# Patient Record
Sex: FEMALE | Race: ASIAN | NOT HISPANIC OR LATINO | Employment: UNEMPLOYED | URBAN - METROPOLITAN AREA
[De-identification: names, ages, dates, MRNs, and addresses within clinical notes are randomized per-mention and may not be internally consistent; named-entity substitution may affect disease eponyms.]

---

## 2018-04-30 ENCOUNTER — OFFICE VISIT - HEALTHEAST (OUTPATIENT)
Dept: FAMILY MEDICINE | Facility: CLINIC | Age: 23
End: 2018-04-30

## 2018-04-30 DIAGNOSIS — Z34.90 PREGNANCY: ICD-10-CM

## 2018-04-30 DIAGNOSIS — N91.2 AMENORRHEA: ICD-10-CM

## 2018-04-30 LAB — HCG UR QL: POSITIVE

## 2018-04-30 ASSESSMENT — MIFFLIN-ST. JEOR: SCORE: 1105.1

## 2018-05-07 ENCOUNTER — RECORDS - HEALTHEAST (OUTPATIENT)
Dept: ADMINISTRATIVE | Facility: OTHER | Age: 23
End: 2018-05-07

## 2018-05-09 ENCOUNTER — PRENATAL OFFICE VISIT - HEALTHEAST (OUTPATIENT)
Dept: FAMILY MEDICINE | Facility: CLINIC | Age: 23
End: 2018-05-09

## 2018-05-09 DIAGNOSIS — B18.1 CHRONIC HEPATITIS B VIRUS INFECTION (H): ICD-10-CM

## 2018-05-09 DIAGNOSIS — D64.9 ANEMIA: ICD-10-CM

## 2018-05-09 DIAGNOSIS — Z34.90 PREGNANCY: ICD-10-CM

## 2018-05-09 DIAGNOSIS — Z12.4 PAP SMEAR FOR CERVICAL CANCER SCREENING: ICD-10-CM

## 2018-05-09 LAB
ALBUMIN SERPL-MCNC: 3.2 G/DL (ref 3.5–5)
ALBUMIN UR-MCNC: NEGATIVE MG/DL
ALP SERPL-CCNC: 40 U/L (ref 45–120)
ALT SERPL W P-5'-P-CCNC: 12 U/L (ref 0–45)
AMPHETAMINES UR QL SCN: NORMAL
APPEARANCE UR: CLEAR
AST SERPL W P-5'-P-CCNC: 17 U/L (ref 0–40)
BARBITURATES UR QL: NORMAL
BASOPHILS # BLD AUTO: 0 THOU/UL (ref 0–0.2)
BASOPHILS NFR BLD AUTO: 0 % (ref 0–2)
BENZODIAZ UR QL: NORMAL
BILIRUB DIRECT SERPL-MCNC: 0.2 MG/DL
BILIRUB SERPL-MCNC: 0.4 MG/DL (ref 0–1)
BILIRUB UR QL STRIP: NEGATIVE
CANNABINOIDS UR QL SCN: NORMAL
COCAINE UR QL: NORMAL
COLOR UR AUTO: YELLOW
CREAT UR-MCNC: 120.9 MG/DL
EOSINOPHIL # BLD AUTO: 0.1 THOU/UL (ref 0–0.4)
EOSINOPHIL NFR BLD AUTO: 1 % (ref 0–6)
ERYTHROCYTE [DISTWIDTH] IN BLOOD BY AUTOMATED COUNT: 13.4 % (ref 11–14.5)
GLUCOSE UR STRIP-MCNC: NEGATIVE MG/DL
HCT VFR BLD AUTO: 34 % (ref 35–47)
HGB BLD-MCNC: 11.4 G/DL (ref 12–16)
HGB UR QL STRIP: NEGATIVE
HIV 1+2 AB+HIV1 P24 AG SERPL QL IA: NEGATIVE
KETONES UR STRIP-MCNC: NEGATIVE MG/DL
LEUKOCYTE ESTERASE UR QL STRIP: NEGATIVE
LYMPHOCYTES # BLD AUTO: 1.8 THOU/UL (ref 0.8–4.4)
LYMPHOCYTES NFR BLD AUTO: 26 % (ref 20–40)
MCH RBC QN AUTO: 28.1 PG (ref 27–34)
MCHC RBC AUTO-ENTMCNC: 33.5 G/DL (ref 32–36)
MCV RBC AUTO: 84 FL (ref 80–100)
MONOCYTES # BLD AUTO: 0.3 THOU/UL (ref 0–0.9)
MONOCYTES NFR BLD AUTO: 4 % (ref 2–10)
NEUTROPHILS # BLD AUTO: 4.7 THOU/UL (ref 2–7.7)
NEUTROPHILS NFR BLD AUTO: 69 % (ref 50–70)
NITRATE UR QL: NEGATIVE
OPIATES UR QL SCN: NORMAL
OXYCODONE UR QL: NORMAL
PCP UR QL SCN: NORMAL
PH UR STRIP: 7.5 [PH] (ref 5–8)
PLATELET # BLD AUTO: 173 THOU/UL (ref 140–440)
PMV BLD AUTO: 12.3 FL (ref 8.5–12.5)
PROT SERPL-MCNC: 7 G/DL (ref 6–8)
RBC # BLD AUTO: 4.05 MILL/UL (ref 3.8–5.4)
SP GR UR STRIP: 1.02 (ref 1–1.03)
UROBILINOGEN UR STRIP-ACNC: NORMAL
WBC: 6.8 THOU/UL (ref 4–11)

## 2018-05-09 ASSESSMENT — MIFFLIN-ST. JEOR: SCORE: 1109.63

## 2018-05-10 LAB
ABO/RH(D): NORMAL
ABORH REPEAT: NORMAL
ANTIBODY SCREEN: NEGATIVE
BACTERIA SPEC CULT: NO GROWTH
C TRACH DNA SPEC QL PROBE+SIG AMP: NEGATIVE
COLLECTION METHOD: NORMAL
GUARDIAN FIRST NAME: NORMAL
GUARDIAN LAST NAME: NORMAL
HBV SURFACE AG SERPL QL IA: ABNORMAL
HEALTH CARE PROVIDER CITY: NORMAL
HEALTH CARE PROVIDER NAME: NORMAL
HEALTH CARE PROVIDER PHONE: NORMAL
HEALTH CARE PROVIDER STATE: NORMAL
HEALTH CARE PROVIDER STREET ADDRESS: NORMAL
HEALTH CARE PROVIDER ZIP CODE: NORMAL
LEAD BLD-MCNC: NORMAL UG/DL
LEAD RETEST: NO
LEAD, B: <1 MCG/DL (ref 0–4.9)
N GONORRHOEA DNA SPEC QL NAA+PROBE: NEGATIVE
PATIENT CITY: NORMAL
PATIENT COUNTY: NORMAL
PATIENT EMPLOYER: NORMAL
PATIENT ETHNICITY: NORMAL
PATIENT HOME PHONE: NORMAL
PATIENT OCCUPATION: NORMAL
PATIENT RACE: NORMAL
PATIENT STATE: NORMAL
PATIENT STREET ADDRESS: NORMAL
PATIENT ZIP CODE: NORMAL
RUBV IGG SERPL QL IA: POSITIVE
SUBMITTING LABORATORY PHONE: NORMAL
T PALLIDUM AB SER QL: NEGATIVE
VENOUS/CAPILLARY: NORMAL

## 2018-05-11 LAB
BKR LAB AP ABNORMAL BLEEDING: NO
BKR LAB AP BIRTH CONTROL/HORMONES: NORMAL
BKR LAB AP CERVICAL APPEARANCE: NORMAL
BKR LAB AP GYN ADEQUACY: NORMAL
BKR LAB AP GYN INTERPRETATION: NORMAL
BKR LAB AP HPV REFLEX: NORMAL
BKR LAB AP LMP: NORMAL
BKR LAB AP PATIENT STATUS: NORMAL
BKR LAB AP PREVIOUS ABNORMAL: NO
BKR LAB AP PREVIOUS NORMAL: NORMAL
HBV DNA SERPL NAA+PROBE-ACNC: 182 [IU]/ML
HBV DNA SERPL NAA+PROBE-LOG IU: 2.3 {LOG_IU}/ML
HBV E AG SERPL QL IA: NEGATIVE
HIGH RISK?: NO
PATH REPORT.COMMENTS IMP SPEC: NORMAL
RESULT FLAG (HE HISTORICAL CONVERSION): NORMAL

## 2018-05-12 LAB — HBV E AB SERPL QL IA: POSITIVE

## 2018-05-14 ENCOUNTER — AMBULATORY - HEALTHEAST (OUTPATIENT)
Dept: FAMILY MEDICINE | Facility: CLINIC | Age: 23
End: 2018-05-14

## 2018-05-14 ENCOUNTER — COMMUNICATION - HEALTHEAST (OUTPATIENT)
Dept: FAMILY MEDICINE | Facility: CLINIC | Age: 23
End: 2018-05-14

## 2018-06-11 ENCOUNTER — PRENATAL OFFICE VISIT - HEALTHEAST (OUTPATIENT)
Dept: FAMILY MEDICINE | Facility: CLINIC | Age: 23
End: 2018-06-11

## 2018-06-11 DIAGNOSIS — Z34.90 PREGNANCY: ICD-10-CM

## 2018-06-11 DIAGNOSIS — B18.1 CHRONIC HEPATITIS B VIRUS INFECTION (H): ICD-10-CM

## 2018-06-11 ASSESSMENT — MIFFLIN-ST. JEOR: SCORE: 1118.7

## 2018-06-26 ENCOUNTER — RECORDS - HEALTHEAST (OUTPATIENT)
Dept: ADMINISTRATIVE | Facility: OTHER | Age: 23
End: 2018-06-26

## 2018-07-11 ENCOUNTER — PRENATAL OFFICE VISIT - HEALTHEAST (OUTPATIENT)
Dept: FAMILY MEDICINE | Facility: CLINIC | Age: 23
End: 2018-07-11

## 2018-07-11 DIAGNOSIS — Z34.90 PREGNANCY: ICD-10-CM

## 2018-07-11 DIAGNOSIS — B18.1 CHRONIC HEPATITIS B VIRUS INFECTION (H): ICD-10-CM

## 2018-07-11 DIAGNOSIS — D64.9 ANEMIA: ICD-10-CM

## 2018-07-11 DIAGNOSIS — K59.00 CONSTIPATION: ICD-10-CM

## 2018-07-11 ASSESSMENT — MIFFLIN-ST. JEOR: SCORE: 1150.46

## 2018-07-31 ENCOUNTER — AMBULATORY - HEALTHEAST (OUTPATIENT)
Dept: NURSING | Facility: CLINIC | Age: 23
End: 2018-07-31

## 2018-08-16 ENCOUNTER — PRENATAL OFFICE VISIT - HEALTHEAST (OUTPATIENT)
Dept: FAMILY MEDICINE | Facility: CLINIC | Age: 23
End: 2018-08-16

## 2018-08-16 DIAGNOSIS — Z34.90 PREGNANCY: ICD-10-CM

## 2018-08-16 DIAGNOSIS — B18.1 VIRAL HEPATITIS B CHRONIC (H): ICD-10-CM

## 2018-08-16 LAB
ALBUMIN SERPL-MCNC: 2.8 G/DL (ref 3.5–5)
ALP SERPL-CCNC: 47 U/L (ref 45–120)
ALT SERPL W P-5'-P-CCNC: 9 U/L (ref 0–45)
AST SERPL W P-5'-P-CCNC: 14 U/L (ref 0–40)
BILIRUB DIRECT SERPL-MCNC: <0.1 MG/DL
BILIRUB SERPL-MCNC: 0.3 MG/DL (ref 0–1)
FASTING STATUS PATIENT QL REPORTED: NO
GLUCOSE 1H P 50 G GLC PO SERPL-MCNC: 101 MG/DL (ref 70–139)
HGB BLD-MCNC: 9.8 G/DL (ref 12–16)
PROT SERPL-MCNC: 6.1 G/DL (ref 6–8)

## 2018-08-16 ASSESSMENT — MIFFLIN-ST. JEOR: SCORE: 1204.89

## 2018-08-17 LAB — T PALLIDUM AB SER QL: NEGATIVE

## 2018-08-21 LAB
HBV DNA SERPL NAA+PROBE-ACNC: 25 [IU]/ML
HBV DNA SERPL NAA+PROBE-LOG IU: 1.4 {LOG_IU}/ML

## 2018-08-23 ENCOUNTER — COMMUNICATION - HEALTHEAST (OUTPATIENT)
Dept: FAMILY MEDICINE | Facility: CLINIC | Age: 23
End: 2018-08-23

## 2018-08-30 ENCOUNTER — PRENATAL OFFICE VISIT - HEALTHEAST (OUTPATIENT)
Dept: FAMILY MEDICINE | Facility: CLINIC | Age: 23
End: 2018-08-30

## 2018-08-30 DIAGNOSIS — Z23 NEED FOR IMMUNIZATION AGAINST INFLUENZA: ICD-10-CM

## 2018-08-30 DIAGNOSIS — Z34.90 PREGNANCY: ICD-10-CM

## 2018-08-30 ASSESSMENT — MIFFLIN-ST. JEOR: SCORE: 1216.23

## 2018-09-12 ENCOUNTER — PRENATAL OFFICE VISIT - HEALTHEAST (OUTPATIENT)
Dept: FAMILY MEDICINE | Facility: CLINIC | Age: 23
End: 2018-09-12

## 2018-09-12 DIAGNOSIS — Z34.90 PREGNANCY: ICD-10-CM

## 2018-09-12 DIAGNOSIS — B18.1 CHRONIC HEPATITIS B VIRUS INFECTION (H): ICD-10-CM

## 2018-09-12 DIAGNOSIS — F43.21 ADJUSTMENT DISORDER WITH DEPRESSED MOOD: ICD-10-CM

## 2018-09-12 DIAGNOSIS — O99.019 ANEMIA IN PREGNANCY: ICD-10-CM

## 2018-09-12 LAB — HGB BLD-MCNC: 10.1 G/DL (ref 12–16)

## 2018-09-12 ASSESSMENT — MIFFLIN-ST. JEOR: SCORE: 1236.64

## 2018-09-27 ENCOUNTER — PRENATAL OFFICE VISIT - HEALTHEAST (OUTPATIENT)
Dept: FAMILY MEDICINE | Facility: CLINIC | Age: 23
End: 2018-09-27

## 2018-09-27 DIAGNOSIS — F43.21 ADJUSTMENT DISORDER WITH DEPRESSED MOOD: ICD-10-CM

## 2018-09-27 DIAGNOSIS — Z3A.33 33 WEEKS GESTATION OF PREGNANCY: ICD-10-CM

## 2018-09-27 DIAGNOSIS — B18.1 CHRONIC HEPATITIS B VIRUS INFECTION (H): ICD-10-CM

## 2018-09-27 DIAGNOSIS — D50.8 IRON DEFICIENCY ANEMIA SECONDARY TO INADEQUATE DIETARY IRON INTAKE: ICD-10-CM

## 2018-09-27 ASSESSMENT — MIFFLIN-ST. JEOR: SCORE: 1237.77

## 2018-10-10 ENCOUNTER — PRENATAL OFFICE VISIT - HEALTHEAST (OUTPATIENT)
Dept: FAMILY MEDICINE | Facility: CLINIC | Age: 23
End: 2018-10-10

## 2018-10-10 DIAGNOSIS — B18.1 CHRONIC HEPATITIS B VIRUS INFECTION (H): ICD-10-CM

## 2018-10-10 DIAGNOSIS — Z34.90 PREGNANCY: ICD-10-CM

## 2018-10-10 ASSESSMENT — MIFFLIN-ST. JEOR: SCORE: 1236.64

## 2018-10-11 LAB
ALLERGIC TO PENICILLIN: NO
GP B STREP DNA SPEC QL NAA+PROBE: NEGATIVE

## 2018-10-19 ENCOUNTER — PRENATAL OFFICE VISIT - HEALTHEAST (OUTPATIENT)
Dept: FAMILY MEDICINE | Facility: CLINIC | Age: 23
End: 2018-10-19

## 2018-10-19 DIAGNOSIS — Z34.90 PREGNANCY: ICD-10-CM

## 2018-10-19 DIAGNOSIS — B18.1 CHRONIC HEPATITIS B VIRUS INFECTION (H): ICD-10-CM

## 2018-10-19 ASSESSMENT — MIFFLIN-ST. JEOR: SCORE: 1245.71

## 2018-10-26 ENCOUNTER — PRENATAL OFFICE VISIT - HEALTHEAST (OUTPATIENT)
Dept: FAMILY MEDICINE | Facility: CLINIC | Age: 23
End: 2018-10-26

## 2018-10-26 DIAGNOSIS — Z34.90 PREGNANCY: ICD-10-CM

## 2018-10-26 ASSESSMENT — MIFFLIN-ST. JEOR: SCORE: 1254.78

## 2018-11-02 ENCOUNTER — PRENATAL OFFICE VISIT - HEALTHEAST (OUTPATIENT)
Dept: FAMILY MEDICINE | Facility: CLINIC | Age: 23
End: 2018-11-02

## 2018-11-02 DIAGNOSIS — B18.1 CHRONIC HEPATITIS B VIRUS INFECTION (H): ICD-10-CM

## 2018-11-02 DIAGNOSIS — Z3A.38 PREGNANCY WITH 38 COMPLETED WEEKS GESTATION: ICD-10-CM

## 2018-11-02 ASSESSMENT — MIFFLIN-ST. JEOR: SCORE: 1259.32

## 2018-11-07 ENCOUNTER — PRENATAL OFFICE VISIT - HEALTHEAST (OUTPATIENT)
Dept: FAMILY MEDICINE | Facility: CLINIC | Age: 23
End: 2018-11-07

## 2018-11-07 DIAGNOSIS — Z3A.39 39 WEEKS GESTATION OF PREGNANCY: ICD-10-CM

## 2018-11-07 DIAGNOSIS — B18.1 CHRONIC HEPATITIS B VIRUS INFECTION (H): ICD-10-CM

## 2018-11-07 ASSESSMENT — MIFFLIN-ST. JEOR: SCORE: 1263.86

## 2018-11-16 ENCOUNTER — PRENATAL OFFICE VISIT - HEALTHEAST (OUTPATIENT)
Dept: FAMILY MEDICINE | Facility: CLINIC | Age: 23
End: 2018-11-16

## 2018-11-16 DIAGNOSIS — O48.0 POST-TERM PREGNANCY, 40-42 WEEKS OF GESTATION: ICD-10-CM

## 2018-11-16 DIAGNOSIS — B18.1 CHRONIC HEPATITIS B VIRUS INFECTION (H): ICD-10-CM

## 2018-11-16 DIAGNOSIS — Z3A.40 40 WEEKS GESTATION OF PREGNANCY: ICD-10-CM

## 2018-11-16 ASSESSMENT — MIFFLIN-ST. JEOR: SCORE: 1268.39

## 2018-11-18 ENCOUNTER — HOME CARE/HOSPICE - HEALTHEAST (OUTPATIENT)
Dept: HOME HEALTH SERVICES | Facility: HOME HEALTH | Age: 23
End: 2018-11-18

## 2018-11-20 ENCOUNTER — HOME CARE/HOSPICE - HEALTHEAST (OUTPATIENT)
Dept: HOME HEALTH SERVICES | Facility: HOME HEALTH | Age: 23
End: 2018-11-20

## 2019-02-08 ENCOUNTER — COMMUNICATION - HEALTHEAST (OUTPATIENT)
Dept: TELEHEALTH | Facility: CLINIC | Age: 24
End: 2019-02-08

## 2019-02-22 ENCOUNTER — OFFICE VISIT - HEALTHEAST (OUTPATIENT)
Dept: FAMILY MEDICINE | Facility: CLINIC | Age: 24
End: 2019-02-22

## 2019-02-22 DIAGNOSIS — Z97.5 NEXPLANON IN PLACE: ICD-10-CM

## 2019-02-22 DIAGNOSIS — Z30.017 NEXPLANON INSERTION: ICD-10-CM

## 2019-02-22 LAB — HCG UR QL: NEGATIVE

## 2019-02-22 ASSESSMENT — MIFFLIN-ST. JEOR: SCORE: 1173.14

## 2021-06-01 VITALS — WEIGHT: 122 LBS | BODY MASS INDEX: 23.95 KG/M2 | HEIGHT: 60 IN

## 2021-06-01 VITALS — BODY MASS INDEX: 19.63 KG/M2 | WEIGHT: 100 LBS | HEIGHT: 60 IN

## 2021-06-01 VITALS — WEIGHT: 101 LBS | HEIGHT: 60 IN | BODY MASS INDEX: 19.83 KG/M2

## 2021-06-01 VITALS — BODY MASS INDEX: 21.6 KG/M2 | HEIGHT: 60 IN | WEIGHT: 110 LBS

## 2021-06-01 VITALS — BODY MASS INDEX: 20.22 KG/M2 | HEIGHT: 60 IN | WEIGHT: 103 LBS

## 2021-06-02 VITALS — BODY MASS INDEX: 25.38 KG/M2 | HEIGHT: 60 IN | WEIGHT: 129.25 LBS

## 2021-06-02 VITALS — HEIGHT: 60 IN | WEIGHT: 134 LBS | BODY MASS INDEX: 26.31 KG/M2

## 2021-06-02 VITALS — BODY MASS INDEX: 25.32 KG/M2 | WEIGHT: 129 LBS | HEIGHT: 60 IN

## 2021-06-02 VITALS — HEIGHT: 60 IN | WEIGHT: 136 LBS | BODY MASS INDEX: 26.7 KG/M2

## 2021-06-02 VITALS — BODY MASS INDEX: 22.58 KG/M2 | WEIGHT: 115 LBS | HEIGHT: 60 IN

## 2021-06-02 VITALS — WEIGHT: 133 LBS | BODY MASS INDEX: 26.11 KG/M2 | HEIGHT: 60 IN

## 2021-06-02 VITALS — WEIGHT: 129 LBS | BODY MASS INDEX: 25.32 KG/M2 | HEIGHT: 60 IN

## 2021-06-02 VITALS — HEIGHT: 60 IN | WEIGHT: 131 LBS | BODY MASS INDEX: 25.72 KG/M2

## 2021-06-02 VITALS — BODY MASS INDEX: 26.5 KG/M2 | WEIGHT: 135 LBS | HEIGHT: 60 IN

## 2021-06-02 VITALS — HEIGHT: 60 IN | BODY MASS INDEX: 24.44 KG/M2 | WEIGHT: 124.5 LBS

## 2021-06-05 ENCOUNTER — RECORDS - HEALTHEAST (OUTPATIENT)
Dept: MIDWIFE SERVICES | Facility: CLINIC | Age: 26
End: 2021-06-05

## 2021-06-05 DIAGNOSIS — B18.1 VIRAL HEPATITIS B CHRONIC (H): ICD-10-CM

## 2021-06-05 DIAGNOSIS — R10.11 ABDOMINAL PAIN, RIGHT UPPER QUADRANT: ICD-10-CM

## 2021-06-16 PROBLEM — Z97.5 NEXPLANON IN PLACE: Status: ACTIVE | Noted: 2019-02-22

## 2021-06-17 NOTE — PROGRESS NOTES
New OB Clinic Note - 2018   Visit was completed along with Vibha .   SUBJECTIVE:  Sinan Dubon is a 23 y.o.  female  who is here for new OB visit.   LMP very unsure. Had dating US at [facility]- awaiting results.  Current Concerns: none  She has not had nausea and vomiting.   She denies bleeding, cramping. No loss of fluid. She denies HA.   Denies dysuria or hematuria.   Denies constipation.  OB History:  Patient is . Prior Pregnancies:  OB History    Para Term  AB Living   2 1 1   1   SAB TAB Ectopic Multiple Live Births       1      # Outcome Date GA Lbr Khris/2nd Weight Sex Delivery Anes PTL Lv   2 Current            1 Term 05/21/15 40w4d 07:46 / 00:22 7 lb 10 oz (3.459 kg) F Vag-Spont Local N LANCE        She does not have a history of  birth before 37 weeks with a goodman gestation.  She does not have a history of preeclampsia in prior pregnancy.   She does not have a history of recurrent (>2) pregnancy losses.  She does not have a history of Down's syndrome, sickle cell anemia or other genetic disorder affecting a child in her family.  She does have a history of major depression or postpartum depression. Declines any mood issues today. Will continue to monitor closely.  She does not have a history of STI's including Chlamydia, gonorrhea, syphilis, HPV, or genital herpes.   Social Hx:   She has good support from her family and father of baby, , Rio Wilkerson, is involved.   She has not used tobacco, has not used EtOH and has not used illicit drugs.  Current Medications: prenatal vitamins    Past Medical History:   Diagnosis Date     Depression      Hepatitis B, chronic      Past Surgical History:   Procedure Laterality Date     negative       Family History   Problem Relation Age of Onset     No Medical Problems Mother      No Medical Problems Father      Current Outpatient Prescriptions on File Prior to Visit   Medication Sig Dispense Refill      prenat.vits,juan carlos,min-iron-folic Tab Take 1 tablet by mouth daily. 100 each 3     No current facility-administered medications on file prior to visit.        OBJECTIVE:  Vitals:    18 1235   BP: 96/46   Patient Site: Right Arm   Patient Position: Sitting   Cuff Size: Adult Regular   Pulse: 88   Resp: 16   Temp: 97.8  F (36.6  C)   TempSrc: Oral   Weight: 101 lb (45.8 kg)   Height: 5' (1.524 m)     Gen: Awake, alert, in no acute distress  HEENT: oral mucosa is moist and pink, dentition is adequate   Neck: Thyroid is non-enlarged, without nodules or tenderness  CV: RRR with normal S1 and S2. No murmurs appreciated.  Resp: Lungs are clear to auscultation bilaterally without crackles or wheezing.  Abd: non-tender, non-distended. Bowel sounds present.   Pelvic Exam: Vagina and vulva are normal; no discharge is noted. Cervix normal without lesions. Uterus anteverted and mobile, normal in size and shape without tenderness. Adnexa normal in size without masses or tenderness. She is due for a pap smear today.  Lower extremities: No edema  Neuro: No focal deficits  's.  Results for orders placed or performed in visit on 18   Urinalysis Macroscopic   Result Value Ref Range    Color, UA Yellow Colorless, Yellow, Straw, Light Yellow    Clarity, UA Clear Clear    Glucose, UA Negative Negative    Bilirubin, UA Negative Negative    Ketones, UA Negative Negative    Specific Gravity, UA 1.020 1.005 - 1.030    Blood, UA Negative Negative    pH, UA 7.5 5.0 - 8.0    Protein, UA Negative Negative mg/dL    Urobilinogen, UA 0.2 E.U./dL 0.2 E.U./dL, 1.0 E.U./dL    Nitrite, UA Negative Negative    Leukocytes, UA Negative Negative     ASSESSMENT/PLAN:  Sinan Dubon is a 23 y.o. . Estimated Date of Delivery: unsure, very unsure LMP so awaiting ultrasound results from [facility] (had this completed 2 days ago)  1. Discussed recommended weight gain, healthy eating habits, exercise, common first trimester concerns (nausea,  vomiting, constipation, fatigue, GERD, urinary frequency) and warning signs for miscarriage and  labor (bleeding, cramping).   2. Continue prenatal vitamins.   3. Pelvic exam including GC, Chlamydia and PAP (if >21yrs) was completed.  4. Prenatal labs completed - prenatal profile, UA/UC, HIV   5. Reviewed eligibility for low-dose aspirin therapy for prevention of preeclampsia (preeclampsia in prior pregnancy, pre-existing HTN or DM, or multiple other risk factors including  delivery, chronic HTN, DM, obesity, low socioeconomic status, non- ethnicity). Patient is not a candidate for this.   6. Reviewed eligibility for 17-hydroxyprogesterone therapy for prevention of  birth (prior goodman delivery before 37 weeks). Patient is not a candidate for this.   7. Counseled on benefits of breastfeeding. Patient is planning to breastfeed. She  her previous baby for 1 year.   8. Discussed regular prenatal exams   Paw was seen today for initial prenatal visit.    Diagnoses and all orders for this visit:    Pregnancy  -     ABO/RH Typing (OP order)  -     Hepatitis B Surface antigen (HBsAG)  -     HIV Antigen/Antibody Screening [place]  -     HM1(CBC and Differential)  -     HML Antibody Screen  -     RPR  -     Rubella Immune Status (IgG)  -     Urinalysis Macroscopic  -     Culture, Urine  -     Chlamydia/gonorrhoeae CERVIX  -     Drugs of Abuse 1,Urine  -     Lead, Blood  -     Pap Smear  -     HM1 (CBC with Diff)    Chronic Hepatitis, B Virus: Hx of chronic hep B with low viral levels, never needing treatment, including during her last pregnancy. Will check levels, and repeat at 28 weeks. Baby will need HBIG and hep B after birth  -     Hepatic Profile  -     Hepatitis Be Antigen  -     Hepatitis Be Antibody (Anti-HBe)  -     Hepatitis B DNA Quantitative Real-Time PCR(HBQNT)($$$)      Pap smear for cervical cancer screening: Normal appearing cervix. If normal, repeat in 3 years.  -      Pap Smear    RTC in 4 weeks or sooner if problems.     Qiana Salmeron MD    Options for treatment and follow-up care were reviewed with the patient and/or guardian. Sinan Hernandezh and/or guardian engaged in the decision making process and verbalized understanding of the options discussed and agreed with the final plan.

## 2021-06-17 NOTE — PROGRESS NOTES
Assessment/Plan:     Sinan Dubon is a 23 y.o.  here for confirmation of pregnancy.    1. Amenorrhea/Pregnancy: LMP unsure, sometime early February.   - Pregnancy, Urine  - Dating ultrasound scheduled today  - Prenatal vitamins  - Discussed miscarriage symptoms/signs    Medications were reviewed for safety in pregnancy.  Risk factors identified today:  Chronic hep B    Return to clinic in about 1-2 weeks for Initial OB Visit.    Subjective:      Sinan Dubon is a 23 y.o. female who presents for evaluation of pregnancy confirmation  Patient's last menstrual period was 2018 (within weeks).  Last period was normal  Current contraception:  unsure  Pregnancy is unplanned but happy about pregnancy  - Rio Pate Saw   Current symptoms also include: positive home pregnancy test     Risk behaviors:    Tobacco use:  reports that she has never smoked. She has never used smokeless tobacco.  Drug or alcohol use: denies      Current Outpatient Prescriptions:      prenat.vits,juan carlos,min-iron-folic Tab, Take 1 tablet by mouth daily., Disp: 100 each, Rfl: 3         Objective:      BP 90/62  Pulse 90  Temp 98  F (36.7  C) (Oral)   Resp 16  Ht 5' (1.524 m)  Wt 100 lb (45.4 kg)  LMP 2018 (Within Weeks)  SpO2 98%  BMI 19.53 kg/m2  Gen:  A&A, NAD.  Abd: Non-tender uterus  FHT's: too early, not heard    Lab Review  Results for orders placed or performed in visit on 18   Pregnancy, Urine   Result Value Ref Range    Pregnancy Test, Urine Positive (!) Negative

## 2021-06-18 NOTE — PROGRESS NOTES
SUBJECTIVE:  Sinan Dubon is a 23 y.o. female  at 18w0d by 13 wk US. Estimated Date of Delivery: 18.  She is doing well. She denies nausea and vomiting. She denies abdominal pain/cramping, vaginal bleeding, leakage of fluid. Fetal movement is absent.   Concerns: none. She reports that she has a crib, car seat for baby. She has signed up for WIC.  ROS  Negative except as noted above in HPI.  OBJECTIVE:  Blood pressure 94/48, pulse 93, temperature 98  F (36.7  C), temperature source Oral, resp. rate 16, height 5' (1.524 m), weight 103 lb (46.7 kg), last menstrual period 2018, SpO2 100 %, currently breastfeeding.  Gen: comfortable, no acute distress.  See OB Vitals flowsheet.  ASSESSMENT/PLAN:  Sinan was seen today for routine prenatal visit.    Diagnoses and all orders for this visit:    Pregnancy  -     Ambulatory referral to Obstetrics / Gynecology    Chronic Hepatitis, B Virus: Low viral levels, did not require treatment in previous pregnancy. Will recheck around 28 weeks. Baby will need HBIG and hep B vaccine.    -IUP at 18w0d:   Prenatal labs reviewed and Normal.   Quad screen declined today.  Fetal survey- placed referral today for approximately 2 weeks  Peripartum Anesthesia: the patient does not desire an epidural during labor. Wants to try IV fentanyl  Post-partum Contraception: Unsure   Breast/Bottle: breast and bottle  RTC in 4 weeks or sooner with problems.    Visit completed along with assistance of Vibha .    Qiana Salmeron MD

## 2021-06-19 NOTE — PROGRESS NOTES
Sinan came in to see ABBIE, she is receiving WIC for baby. Abbie mailed $75.00 gift card appl, to [facility] for intial pregnancy visit. Car seat referral was completed. ABBIE visit for safe sleep training and waivers for crib and baby bundle need to be scheduled in September.

## 2021-06-19 NOTE — PROGRESS NOTES
SUBJECTIVE:  Sinan Dubon is a 23 y.o. female  at 27w3d. Estimated Date of Delivery: 18.  She is doing well. She denies nausea and vomiting. She denies abdominal pain/cramping, vaginal bleeding, leakage of fluid. Fetal movement is present.   Concerns: None  ROS  Negative except as noted above in HPI.  OBJECTIVE:  Blood pressure 96/42, pulse 88, temperature 97.4  F (36.3  C), temperature source Oral, resp. rate 16, height 5' (1.524 m), weight 122 lb (55.3 kg), last menstrual period 2018, currently breastfeeding.  Gen: comfortable, no acute distress.  See OB Vitals flowsheet.  ASSESSMENT/PLAN:  Sinan was seen today for routine prenatal visit.    Diagnoses and all orders for this visit:    Pregnancy  -     Glucose,Gestational Challenge (1 Hour)  -     Hemoglobin  -     Syphilis Screen, [place]  -     Tdap vaccine,  8yo or older,  IM    Viral hepatitis B chronic (H): Low viral levels, will recheck today. Baby will need hep B and HBIG.  -     Hepatitis B Virus (HBV) DNA, Detection and Quantification by RT-PCR ($$$)  -     Hepatic Profile    Anemia: Hemoglobin 9.8, continue iron supplement. Recheck in 1 month.  -     ferrous sulfate 325 (65 FE) MG tablet; Take 1 tablet (325 mg total) by mouth daily with breakfast.    -IUP at 27w3d:   Prenatal labs reviewed and Normal.   Fetal survey was done and normal. Breech. EFW 49%ile.  GTT today.  Peripartum Anesthesia: the patient does not desire an epidural during labor.   Post-partum Contraception: Declines   Breast/Bottle: breastfeed   RTC in 2 weeks or sooner with problems.    Visit completed along with assistance of Vibha .    Qiana Salmeron MD

## 2021-06-19 NOTE — PROGRESS NOTES
SUBJECTIVE:  Sinan Dubon is a 23 y.o. female  at 22w2d by 13 wk US. Estimated Date of Delivery: 18.  She is doing well. She denies nausea and vomiting. She denies abdominal pain/cramping, vaginal bleeding, leakage of fluid. Fetal movement is present.   Concerns: She does not have carseat or crib. She has signed up for WIC. She is having a boy! Very excited.  ROS  Negative except as noted above in HPI.  OBJECTIVE:  Blood pressure (!) 82/54, pulse 100, temperature 98.2  F (36.8  C), temperature source Oral, resp. rate 16, height 5' (1.524 m), weight 110 lb (49.9 kg), last menstrual period 2018, SpO2 98 %, currently breastfeeding.  Gen: comfortable, no acute distress.  See OB Vitals flowsheet.  ASSESSMENT/PLAN:  Sinan was seen today for routine prenatal visit.    Diagnoses and all orders for this visit:    Pregnancy    Constipation: Likely related to iron. Gave senna today.  -     senna (SENOKOT) 8.6 mg tablet; Take 1 tablet by mouth daily as needed for constipation.    Anemia: Will recheck next visit. Continue iron supplementation    Chronic Hepatitis, B Virus: Low viral levels, repeat at next visit. Baby will need Hep B and HBIG.      -IUP at 22w2d:   Prenatal labs reviewed and Normal.   Fetal survey was done. Need to obtain records from [facility]  GTT at next appointment.   Peripartum Anesthesia: the patient does not desire an epidural during labor.   Post-partum Contraception: unsure   Breast/Bottle: breastfeed   RTC in 4 weeks or sooner with problems.    Visit completed along with assistance of Vibha .    Qiana Salmeron MD

## 2021-06-20 NOTE — PROGRESS NOTES
SUBJECTIVE:  Sinan Dubon is a 23 y.o. female  at 35w2d. Estimated Date of Delivery: 18.  She is doing well. She denies vaginal bleeding, leakage of fluid, or urinary symptoms. Fetal movement is present. She is not having contractions.  Concerns: None. Sleeping much better- using hydroxyzine  ROS  Negative except as noted above in HPI  OBJECTIVE:  Blood pressure 98/56, pulse 96, temperature 98  F (36.7  C), temperature source Oral, resp. rate 16, height 5' (1.524 m), weight 129 lb (58.5 kg), last menstrual period 2018, SpO2 98 %, not currently breastfeeding.  Gen: Comfortable, no acute distress.  Abd: Gravid, non-tender  See OB Vitals flowsheet.  ASSESSMENT/PLAN:  Sinan was seen today for routine prenatal visit.    Diagnoses and all orders for this visit:    Pregnancy  -     Group B Strep Screen by PCR      -IUP at 35w2d:   Prenatal labs reviewed and Normal.   Hepatitis B: chronic, low viral loads, no treatment indicated. Baby will need hep B and HBIG after birth  Fetal survey was done and normal.   GBS done today  Peripartum Anesthesia: the patient wants to try natural- maybe use IV analgesia  Post-partum Contraception: declines   Breast/Bottle: previously  but wants to formula feed this baby. I have discussed benefits of breastfeeding with mom.   Reviewed plans for getting to the hospital. Will give form at next visit.  RTC in 1 week or sooner with problems.    Visit completed along with assistance of Vibha .    Qiana Salmeron MD

## 2021-06-20 NOTE — PROGRESS NOTES
SUBJECTIVE:  Sinan Dubon is a 23 y.o. female  at 29w3d. Estimated Date of Delivery: 18.  She is doing well. She denies vaginal bleeding, leakage of fluid, or urinary symptoms. Fetal movement is present. She is not having contractions.  Concerns: None  ROS  Negative except as noted above in HPI  OBJECTIVE:  Blood pressure 102/50, pulse 84, temperature 97.6  F (36.4  C), temperature source Oral, resp. rate 20, height 5' (1.524 m), weight 124 lb 8 oz (56.5 kg), last menstrual period 2018, SpO2 99 %, currently breastfeeding.  Gen: Comfortable, no acute distress.  Abd: Gravid, non-tender  See OB Vitals flowsheet.  ASSESSMENT/PLAN:  Sinan was seen today for obv.    Diagnoses and all orders for this visit:    Need for immunization against influenza    Other orders  -     senna (SENOKOT) 8.6 mg tablet; Take 1 tablet by mouth daily as needed for constipation.  -     Influenza, Seasonal Quad, Preservative Free 36+ Months    -IUP at 29w3d:   Prenatal labs reviewed and Normal.   Fetal survey was done and normal except breech.   Peripartum Anesthesia: the patient does not desire an epidural during labor.   Breast/Bottle: formula,  her previous child but wants to exclusively formula feed  Reviewed plans for getting to the hospital.  Reviewed skin to skin.  Reviewed baby meds in hospital and 24 hour testing, delayed cord clamping.    RTC in 2 weeks or sooner with problems.    Visit completed along with assistance of Vibha .    Qiana Salmeron MD

## 2021-06-20 NOTE — PROGRESS NOTES
SUBJECTIVE:  Sinan Dubon is a 23 y.o. female  at 31w2d. Estimated Date of Delivery: 18.  She is doing well. She denies vaginal bleeding, leakage of fluid, or urinary symptoms. Fetal movement is present. She is not having contractions.  Concerns: None. Denies mood concerns.   ROS  Negative except as noted above in HPI  OBJECTIVE:  Blood pressure 98/46, pulse (!) 101, temperature 98.1  F (36.7  C), temperature source Oral, resp. rate 16, height 5' (1.524 m), weight 129 lb (58.5 kg), last menstrual period 2018, SpO2 98 %, currently breastfeeding.  Gen: Comfortable, no acute distress.  Abd: Gravid, non-tender  See OB Vitals flowsheet.  ASSESSMENT/PLAN:  Sinan was seen today for routine prenatal visit.    Diagnoses and all orders for this visit:    Pregnancy    Anemia in pregnancy: On oral iron supplement, recheck today.  -     Hemoglobin    Chronic Hepatitis, B Virus: Low viral levels. Baby needs HBIG and hep B.    Adjustment disorder with depressed mood: In remission, stable mood. Continue to monitor.      -IUP at 31w2d:   Prenatal labs reviewed and normal  Fetal survey was done and normal. Vertex confirmed by bedside ultrasound today  Peripartum Anesthesia: the patient does not desire an epidural during labor.   Breast/Bottle: formula feed (breast fed her previous baby)  Reviewed plans for getting to the hospital.  RTC in 2 weeks or sooner with problems.    Visit completed along with assistance of Vibha .    Qiana Salmeron MD

## 2021-06-20 NOTE — PROGRESS NOTES
SUBJECTIVE:  Sinan Dubon is a 23 y.o. female  at 33w3d. Estimated Date of Delivery: 18.  She is doing well. She denies vaginal bleeding or fluid leaking. Fetal movement is present. She is not having contractions.  Concerns: having lots of trouble sleeping because baby is moving around so much.   OBJECTIVE:  Blood pressure (!) 86/50, pulse 96, temperature 97.8  F (36.6  C), temperature source Oral, resp. rate 16, height 5' (1.524 m), weight 129 lb 4 oz (58.6 kg), last menstrual period 2018, not currently breastfeeding.  Comfortable, no acute distress.  See OB Vitals flowsheet.  ASSESSMENT/PLAN:  Sinan was seen today for routine prenatal visit.    Diagnoses and all orders for this visit:    33 weeks gestation of pregnancy: will try hydroxyzine for sleep.   -     hydrOXYzine HCl (ATARAX) 25 MG tablet; Take 1-2 tablets (25-50 mg total) by mouth at bedtime as needed for anxiety.    Adjustment disorder with depressed mood    Iron deficiency anemia secondary to inadequate dietary iron intake    Chronic Hepatitis, B Virus      -IUP at 33w3d:   Prenatal labs reviewed and Normal except chronic hep B, but low viral load.   Fetal survey was done at 20 weeks and was normal with placenta located fundal.    Peripartum Anesthesia: the patient does not desire an epidural during labor.   Post-partum Contraception: not discussed today   Breast/Bottle: formula.  She states that she did breast-feed her other baby.  She does not have a specific reason for wanting to give formula. I offered that if she decides to breast-feed this baby even for a short amount of time, baby will have benefits to its immune system and brain development beyond doing exclusively formula.  I did emphasize that we will fully support whatever she chooses to do.  Reviewed plans for getting to the hospital.  RTC in 2 weeks with Dr. Salmeron or sooner with problems.    Visit completed along with assistance of Vibha .    Desiree Palmer MD

## 2021-06-21 NOTE — PROGRESS NOTES
SUBJECTIVE:  Sinan Dubon is a 23 y.o. female  at 40w4d. Estimated Date of Delivery: 18.  She is doing well. She denies vaginal bleeding, leakage of fluid, or urinary symptoms. Fetal movement is present. She is having contractions- irregular that started 2 days ago.  Concerns: None. She is okay with induction at 41 weeks.  ROS  Negative except as noted above in HPI  OBJECTIVE:  Blood pressure 100/70, pulse 96, temperature 97.5  F (36.4  C), temperature source Oral, resp. rate 16, height 5' (1.524 m), weight 136 lb (61.7 kg), last menstrual period 2018, SpO2 99 %, not currently breastfeeding.  Gen: Comfortable, no acute distress.  Abd: Gravid, non-tender  See OB Vitals flowsheet  NST: baseline 130, moderate variability, +accels, no decels  [place]: contractions, 3 in 20 minutes, palpate mild.  Cervix: 2/70/-2, soft, midposition.  ASSESSMENT/PLAN:  -IUP at 40w4d:   Prenatal labs reviewed and Normal.   Chronic hep B: Low viral levels, baby will need Hep B and HBIG  Fetal survey was done and normal. Size<dates but last growth US showed interval growth.  GBS status is negative.   Peripartum Anesthesia: the patient does not desire an epidural during labor. Wants to try natural. Okay with IV meds.  Post-partum Contraception: declines   Breast/Bottle: breast and formula  Reviewed plans for getting to the hospital.  Reviewed skin to skin.  Reviewed baby meds in hospital and 24 hour testing, delayed cord clamping.    Plan for induction on  if not yet delivered. Cervix favorable, plan for induction with pitocin.    Visit completed along with assistance of Vibha .    Qiana Salmeron MD

## 2021-06-21 NOTE — PROGRESS NOTES
SUBJECTIVE:  Sinan Dubon is a 23 y.o. female  at 37w4d by 13 wk US. Estimated Date of Delivery: 18.  She is doing well. She denies vaginal bleeding, leakage of fluid, or urinary symptoms. Fetal movement is present. She is not having contractions.  Concerns: None  ROS  Negative except as noted above in HPI  OBJECTIVE:  Blood pressure 92/50, pulse 80, temperature 98  F (36.7  C), temperature source Oral, resp. rate 16, height 5' (1.524 m), weight 133 lb (60.3 kg), last menstrual period 2018, SpO2 98 %, not currently breastfeeding.  Gen: Comfortable, no acute distress.  Abd: Gravid, non-tender  See OB Vitals flowsheet.  ASSESSMENT/PLAN:  -IUP at 37w4d:   Prenatal labs reviewed and Normal.   Fetal survey was done and normal.  GBS status is negative  Peripartum Anesthesia: the patient does not desire an epidural during labor.   Post-partum Contraception: Declines  Breast/Bottle: breast and formula   Reviewed plans for getting to the hospital.  RTC in 1 week or sooner with problems.    Visit completed along with assistance of Vibha .    Qiana Salmeron MD

## 2021-06-21 NOTE — PROGRESS NOTES
SUBJECTIVE:  Sinan Dubon is a 23 y.o. female  at 38w4d. Estimated Date of Delivery: 18.  She is doing well. She denies vaginal bleeding, leakage of fluid, or urinary symptoms. Fetal movement is present. She is not having contractions.  Concerns: None. Has everything she needs for baby.  ROS  Negative except as noted above in HPI  OBJECTIVE:  Blood pressure 102/60, pulse 92, temperature 97.5  F (36.4  C), temperature source Oral, resp. rate 18, height 5' (1.524 m), weight 134 lb (60.8 kg), last menstrual period 2018, SpO2 98 %, not currently breastfeeding.  Gen: Comfortable, no acute distress.  Abd: Gravid, non-tender  See OB Vitals flowsheet.  Cervix: declines today  ASSESSMENT/PLAN:  Sinan was seen today for routine prenatal visit.    Diagnoses and all orders for this visit:    Pregnancy with 38 completed weeks gestation    Chronic Hepatitis, B Virus: Low viral levels, no need for treatment. Baby will need hep B and HBIG.     -IUP at 38w4d:   Prenatal labs reviewed and Normal.   Fetal survey was done and normal.  GBS status is negative.   Peripartum Anesthesia: the patient does not desire an epidural during labor.   Post-partum Contraception: Condoms. Declines other birth control.  Breast/Bottle: breast and formula   Reviewed plans for getting to the hospital.  RTC in 1 week or sooner with problems. She will see Dr. Palmer while I am out of office. Discussed on-call coverage if she goes into labor.    Visit completed along with assistance of Vibha .    Qiana Salmeron MD

## 2021-06-21 NOTE — PROGRESS NOTES
SUBJECTIVE:  Sinan Dubon is a 23 y.o. female  at 39w2d. Estimated Date of Delivery: 18.  She is doing well. She denies vaginal bleeding or fluid leaking. Fetal movement is present. She is not having contractions.  Concerns: none  OBJECTIVE:  Blood pressure 94/58, pulse 88, temperature 98.5  F (36.9  C), temperature source Oral, resp. rate 20, height 5' (1.524 m), weight 135 lb (61.2 kg), last menstrual period 2018, not currently breastfeeding.  Comfortable, no acute distress.  See OB Vitals flowsheet.  ASSESSMENT/PLAN:  Sinan was seen today for routine prenatal visit.    Diagnoses and all orders for this visit:    39 weeks gestation of pregnancy    Chronic Hepatitis, B Virus    -IUP at 39w2d:   Prenatal labs reviewed and Normal.   Fetal survey normal.   GBS status is neg.   Peripartum Anesthesia: the patient does not desire an epidural during labor.   Post-partum Contraception: not discussed today  Breast/Bottle: both   Reviewed plans for getting to the hospital- gave info form in case she needs to take ambulance.  Discussed [facility]. She is going to think about it.  RTC in 1 week with Dr. Salmeron. I offered to come in to deliver her if she goes into labor when Jaron is still on vacation, since I have now met her a couple of times. She agrees.  Visit completed along with assistance of [facility] .    Desiree Palmer MD

## 2021-06-21 NOTE — PROGRESS NOTES
SUBJECTIVE:  Sinan Dubon is a 23 y.o. female  at 36w4d. Estimated Date of Delivery: 18.  She is doing well. She denies vaginal bleeding, leakage of fluid, or urinary symptoms. Fetal movement is present. She is not having contractions.  Concerns: None  ROS  Negative except as noted above in HPI  OBJECTIVE:  Blood pressure 90/42, pulse 84, temperature 97.9  F (36.6  C), temperature source Oral, resp. rate 20, height 5' (1.524 m), weight 131 lb (59.4 kg), last menstrual period 2018, SpO2 99 %, not currently breastfeeding.  Gen: Comfortable, no acute distress.  Abd: Gravid, non-tender  See OB Vitals flowsheet.  ASSESSMENT/PLAN:  There are no diagnoses linked to this encounter.  -IUP at 36w4d:   Prenatal labs reviewed and Normal.   Hep B chronic: low viral levels. Baby needs HBIG and hep B  Gave hospital form today  Fetal survey was done and normal  GBS status is negative.   Peripartum Anesthesia: the patient wants to try natural, maybe use IV analgesia  Post-partum Contraception: declines   Breast/Bottle: previously  but wants to formula feed. I have discussed benefits of breastfeeding extensively.  Reviewed plans for getting to the hospital.  RTC in 1 week or sooner with problems.    Visit completed along with assistance of [facility] .    Qiana Salmeron MD

## 2021-06-24 NOTE — PROGRESS NOTES
Assessment/Plan:     Sinan was seen today for postpartum care.    Diagnoses and all orders for this visit:    Routine postpartum follow-up    Contraception: Discussed options and she desires nexplanon- does not desire another child for at least 5 years. Discussed IUD as well. She desires nexplanon.   -     Pregnancy, Urine    Nexplanon insertion: See below for procedure note. UPT negative and no unprotected intercourse in the last 2 weeks. Discussed side effect profile, including irregular bleeding and possible weight changes. Tolerated well without complication.    Anemia:  Anemia during pregnancy, she is asymptomatic. No need to recheck today.  Breastfeeding/Bottle feeding:  Pt is bottle feeding her baby  Contraception:   Desires nexplanon  Depression:   See [facility] Depresion survey  Exercise:   Encouraged increasing exercise based on how she is feeling.  Healthcare maintenance:   Up to date  Immunizations:    Immunizations up to date         Subjective:      Sinan Dubon is a 23 y.o. female who presents for a postpartum visit.   She is 3 months postpartum following a spontaneous vaginal delivery  I have fully reviewed the prenatal and intrapartum course.   Postpartum course has been Unremarkable.  Baby's course has been Uncomplicated.  Periods:  Have resumed normal Patient's last menstrual period was 2019 (approximate).   Bowel or bladder concerns: none  Patient has resumed intercourse. Uses condoms. Denies unprotected intercourse in the last 2 weeks  [place]  Depression Scale EPDS Total Score: 0 (2019  3:09 PM)     Last hgb on file:    Lab Results   Component Value Date    HGB 10.1 (L) 2018        The following portions of the patient's history were reviewed and updated as appropriate: allergies, current medications and problem list     OB History    Para Term  AB Living   2 2 2     2   SAB TAB Ectopic Multiple Live Births         0 2      # Outcome Date GA Lbr Khris/2nd  Weight Sex Delivery Anes PTL Lv   2 Term 18 40w5d 03:45 / 00:14 8 lb 4 oz (3.742 kg) M Vag-Spont Local N LANCE   1 Term 05/21/15 40w4d 07:46 / 00:22 7 lb 10 oz (3.459 kg) F Vag-Spont Local N LANCE        Information for the patient's :  Seb Wilkerson [[id number]]   Seb Wilkerson      Objective:      Vitals:    19 1504   BP: 96/62   Pulse: 86   Resp: 16   Temp: 98.3  F (36.8  C)   SpO2: 96%       Physical Exam:  General Appearance: Alert, cooperative, no distress, appears stated age  Neck: Supple, no adenopathy;  thyroid: not enlarged, symmetric, no tenderness/mass/nodules  Lungs: Clear to auscultation bilaterally, respirations unlabored  Heart: Regular rate and rhythm, S1 and S2 normal, no murmur, rub, or gallop  Abdomen: Soft, non-tender, no masses, no organomegaly  Pelvic:Normally developed genitalia with no external lesions or eruptions. Vagina and cervix show no lesions, inflammation, discharge or tenderness. No cystocele, No rectocele. Uterus normal.  No adnexal mass or tenderness.  Extremities: Extremities normal, atraumatic, no cyanosis or edema  Skin: Skin color, texture, turgor normal, no rashes or lesions  Neurologic: Normal     PROCEDURE NOTE NEXPLANON INSERTION:    We reviewed the Nexplanon literature and counseling re full range of contraceptive options. She denies any contraindications to its use. We discussed that her bleeding profile will change. She is aware of 3 year effectiveness of this method.  She is aware of potential side effects including infection at site, intermenstrual bleeding, irregular bleeding, amenorrhea, need for minor surgical procedure to remove or more extensive if implant is deep    Appropriate candidate for Nexplanon    PLAN & PROCEDURE NOTE:  She elected to proceed with the placement after the risks and benefits were discussed. Denies allergy to local anesthesia, keloid formation.     Consent signed and will be scanned in EMR.     After cleansing left (non-dominant)  arm above the elbow, 2 mL of 1% Lidocaine was administered along the planned injection site for Nexplanon. Insertion site cleansed with iodine. Nexplanon was then inserted 8cm above the medial epicondyle of the humerus, in the groove between the biceps and the triceps (sulcus bicipitalis medialis). Palpation revealed subdermal placement; the patient was able to feel implant as well.     Bleeding was minimal and a pressure dressing was applied with instructions to leave this in place for 24 hours. Pt was instructed to observe for signs/symptoms of any infection (localized tenderness, pain, inflammation) or excessive bruising.  No apparent distress at completion of procedure. No complications.       Qiana Salmeron MD

## 2021-07-14 PROBLEM — Z34.90 PREGNANT: Status: RESOLVED | Noted: 2018-11-17 | Resolved: 2018-11-18

## 2021-07-14 PROBLEM — Z34.90 PREGNANCY: Status: RESOLVED | Noted: 2018-04-30 | Resolved: 2018-11-18

## 2022-02-09 ENCOUNTER — OFFICE VISIT (OUTPATIENT)
Dept: FAMILY MEDICINE | Facility: CLINIC | Age: 27
End: 2022-02-09
Payer: COMMERCIAL

## 2022-02-09 VITALS
BODY MASS INDEX: 24.88 KG/M2 | HEIGHT: 60 IN | WEIGHT: 126.75 LBS | TEMPERATURE: 98.8 F | SYSTOLIC BLOOD PRESSURE: 100 MMHG | RESPIRATION RATE: 16 BRPM | HEART RATE: 86 BPM | OXYGEN SATURATION: 99 % | DIASTOLIC BLOOD PRESSURE: 70 MMHG

## 2022-02-09 DIAGNOSIS — N91.2 AMENORRHEA: Primary | ICD-10-CM

## 2022-02-09 DIAGNOSIS — G47.00 INSOMNIA, UNSPECIFIED TYPE: ICD-10-CM

## 2022-02-09 DIAGNOSIS — D64.9 ANEMIA, UNSPECIFIED TYPE: ICD-10-CM

## 2022-02-09 DIAGNOSIS — B18.1 CHRONIC HEPATITIS B VIRUS INFECTION (H): ICD-10-CM

## 2022-02-09 LAB
ALBUMIN SERPL-MCNC: 4 G/DL (ref 3.5–5)
ALP SERPL-CCNC: 50 U/L (ref 45–120)
ALT SERPL W P-5'-P-CCNC: 17 U/L (ref 0–45)
ANION GAP SERPL CALCULATED.3IONS-SCNC: 10 MMOL/L (ref 5–18)
AST SERPL W P-5'-P-CCNC: 17 U/L (ref 0–40)
BILIRUB SERPL-MCNC: 0.6 MG/DL (ref 0–1)
BUN SERPL-MCNC: 9 MG/DL (ref 8–22)
CALCIUM SERPL-MCNC: 9.1 MG/DL (ref 8.5–10.5)
CHLORIDE BLD-SCNC: 109 MMOL/L (ref 98–107)
CO2 SERPL-SCNC: 19 MMOL/L (ref 22–31)
CREAT SERPL-MCNC: 0.69 MG/DL (ref 0.6–1.1)
ERYTHROCYTE [DISTWIDTH] IN BLOOD BY AUTOMATED COUNT: 13.4 % (ref 10–15)
FERRITIN SERPL-MCNC: 20 NG/ML (ref 10–130)
GFR SERPL CREATININE-BSD FRML MDRD: >90 ML/MIN/1.73M2
GLUCOSE BLD-MCNC: 76 MG/DL (ref 70–125)
HCG UR QL: NEGATIVE
HCT VFR BLD AUTO: 38.3 % (ref 35–47)
HGB BLD-MCNC: 12.7 G/DL (ref 11.7–15.7)
MCH RBC QN AUTO: 27.6 PG (ref 26.5–33)
MCHC RBC AUTO-ENTMCNC: 33.2 G/DL (ref 31.5–36.5)
MCV RBC AUTO: 83 FL (ref 78–100)
PLATELET # BLD AUTO: 231 10E3/UL (ref 150–450)
POTASSIUM BLD-SCNC: 3.4 MMOL/L (ref 3.5–5)
PROT SERPL-MCNC: 8 G/DL (ref 6–8)
RBC # BLD AUTO: 4.6 10E6/UL (ref 3.8–5.2)
SODIUM SERPL-SCNC: 138 MMOL/L (ref 136–145)
WBC # BLD AUTO: 8.1 10E3/UL (ref 4–11)

## 2022-02-09 PROCEDURE — 80053 COMPREHEN METABOLIC PANEL: CPT | Performed by: FAMILY MEDICINE

## 2022-02-09 PROCEDURE — 85027 COMPLETE CBC AUTOMATED: CPT | Performed by: FAMILY MEDICINE

## 2022-02-09 PROCEDURE — 99214 OFFICE O/P EST MOD 30 MIN: CPT | Performed by: FAMILY MEDICINE

## 2022-02-09 PROCEDURE — 87517 HEPATITIS B DNA QUANT: CPT | Performed by: FAMILY MEDICINE

## 2022-02-09 PROCEDURE — 83540 ASSAY OF IRON: CPT | Performed by: FAMILY MEDICINE

## 2022-02-09 PROCEDURE — 84466 ASSAY OF TRANSFERRIN: CPT | Performed by: FAMILY MEDICINE

## 2022-02-09 PROCEDURE — 81025 URINE PREGNANCY TEST: CPT | Performed by: FAMILY MEDICINE

## 2022-02-09 PROCEDURE — 82728 ASSAY OF FERRITIN: CPT | Performed by: FAMILY MEDICINE

## 2022-02-09 PROCEDURE — 36415 COLL VENOUS BLD VENIPUNCTURE: CPT | Performed by: FAMILY MEDICINE

## 2022-02-09 ASSESSMENT — MIFFLIN-ST. JEOR: SCORE: 1218.49

## 2022-02-09 NOTE — PROGRESS NOTES
"OUTPATIENT VISIT NOTE                                                   Date of Visit: 2/9/2022     Chief Complaint   Patient presents with:  Dizziness            History of Present Illness   Sinan Dubon is a 26 year old female with  by phone c/o dizziness and feeling tired.  Got worse over the last couple of months.  LMP was in December.  Not using birth control.  Doesn't desire pregnancy.         MEDICATIONS   Current Outpatient Medications   Medication     doxylamine (UNISOM) 25 MG TABS tablet     etonogestrel (NEXPLANON) 68 mg Impl implant     No current facility-administered medications for this visit.         SOCIAL HISTORY   Social History     Tobacco Use     Smoking status: Never Smoker     Smokeless tobacco: Never Used   Substance Use Topics     Alcohol use: No           Physical Exam   Vitals:    02/09/22 1144   BP: 100/70   Pulse: 86   Resp: 16   Temp: 98.8  F (37.1  C)   TempSrc: Oral   SpO2: 99%   Weight: 57.5 kg (126 lb 12 oz)   Height: 1.511 m (4' 11.5\")        GENERAL:   Alert. Oriented.  EYES: Clear  HENT:  Ears: R TM pearly gray. Normal landmarks. L TM pearly gray.  Normal landmarks  Nose: Clear.  Sinuses: Nontender.  Oropharynx:  No erythema. No exudate.  NECK: Supple. No adenopathy.  LUNGS: Clear to ascultation.  No crackles.  No wheezing  HEART: RRR  SKIN:  No rash.      Diagnostic Studies   LABS:  Results for orders placed or performed in visit on 02/09/22   HCG qualitative urine     Status: Normal   Result Value Ref Range    hCG Urine Qualitative Negative Negative   CBC with platelets     Status: Normal   Result Value Ref Range    WBC Count 8.1 4.0 - 11.0 10e3/uL    RBC Count 4.60 3.80 - 5.20 10e6/uL    Hemoglobin 12.7 11.7 - 15.7 g/dL    Hematocrit 38.3 35.0 - 47.0 %    MCV 83 78 - 100 fL    MCH 27.6 26.5 - 33.0 pg    MCHC 33.2 31.5 - 36.5 g/dL    RDW 13.4 10.0 - 15.0 %    Platelet Count 231 150 - 450 10e3/uL            Assessment and Plan     Amenorrhea   UPT negative.  Recheck in " 2-3 weeks if still no period.  Discussed need for contraception if she doesn't desire pregnancy  - HCG qualitative urine    Chronic Hepatitis, B Virus  Has history of chronic hepatitis B.  Has not had labs checked since 2018  Check today and return for follow up  - Hep B Virus DNA Quant Real Time PCR  - Comprehensive metabolic panel  - Hep B Virus DNA Quant Real Time PCR  - Comprehensive metabolic panel    Insomnia, unspecified type  unisom as needed for sleep  - doxylamine (UNISOM) 25 MG TABS tablet  Dispense: 60 tablet; Refill: 1    Anemia, unspecified type  HGB norml today.  Recheck as needed.  - CBC with platelets  - Ferritin  - Iron binding panel  - CBC with platelets  - Ferritin  - Iron binding panel                   Discussed signs / symptoms that warrant urgent / emergent medical attention.     Recheck if worsening or not improving.       Angel Okeefe MD          Pertinent History     The following portions of the patient's history were reviewed and updated as appropriate: allergies, current medications, past family history, past medical history, past social history, past surgical history and problem list.

## 2022-02-10 LAB
HBV DNA SERPL NAA+PROBE-ACNC: 50 IU/ML
HBV DNA SERPL NAA+PROBE-LOG IU: 1.7 {LOG_IU}/ML
IRON SATN MFR SERPL: 14 % (ref 20–50)
IRON SERPL-MCNC: 56 UG/DL (ref 42–175)
TIBC SERPL-MCNC: 400 UG/DL (ref 313–563)
TRANSFERRIN SERPL-MCNC: 320 MG/DL (ref 212–360)

## 2022-03-01 ENCOUNTER — NURSE TRIAGE (OUTPATIENT)
Dept: NURSING | Facility: CLINIC | Age: 27
End: 2022-03-01
Payer: COMMERCIAL

## 2022-03-01 NOTE — CONFIDENTIAL NOTE
S-(situation): Call from patient requesting lab results from 2/9/22      B-(background): seen by Dr. JV Okeefe      A-(assessment): labs back but no note to relay to patient      R-(recommendations): Informed patient will route message to the clinic.    Caller verbalized understanding.          Laxmi Barrientos RN/[facility]

## 2022-03-01 NOTE — TELEPHONE ENCOUNTER
Chart reviewed. Please review findings below.     Component      Latest Ref Rng & Units 9/12/2018 2/9/2022   WBC      4.0 - 11.0 10e3/uL  8.1   RBC Count      3.80 - 5.20 10e6/uL  4.60   Hemoglobin      11.7 - 15.7 g/dL 10.1 (L) 12.7   Hematocrit      35.0 - 47.0 %  38.3   MCV      78 - 100 fL  83   MCH      26.5 - 33.0 pg  27.6   MCHC      31.5 - 36.5 g/dL  33.2   RDW      10.0 - 15.0 %  13.4   Platelet Count      150 - 450 10e3/uL  231     Component      Latest Ref Rng & Units 8/16/2018 2/9/2022   Hepatitis B DNA IU/mL, Instrument      <1 IU/mL  50 (H)   HEP B Virus DNA Quant Log       1.4 (H) 1.7     Component      Latest Ref Rng & Units 2/9/2022   Iron      42 - 175 ug/dL 56   Transferrin      212 - 360 mg/dL 320   Transferrin Saturation      20 - 50 % 14 (L)   Transferrin IBC      313 - 563 ug/dL 400     Component      Latest Ref Rng & Units 2/9/2022   Ferritin      10 - 130 ng/mL 20     Component      Latest Ref Rng & Units 8/16/2018 2/9/2022   Sodium      136 - 145 mmol/L  138   Potassium      3.5 - 5.0 mmol/L  3.4 (L)   Chloride      98 - 107 mmol/L  109 (H)   Carbon Dioxide      22 - 31 mmol/L  19 (L)   Anion Gap      5 - 18 mmol/L  10   Urea Nitrogen      8 - 22 mg/dL  9   Creatinine      0.60 - 1.10 mg/dL  0.69   Calcium      8.5 - 10.5 mg/dL  9.1   Glucose      70 - 125 mg/dL  76   Alkaline Phosphatase      45 - 120 U/L 47 50   AST      0 - 40 U/L 14 17   ALT      0 - 45 U/L 9 17   Protein Total      6.0 - 8.0 g/dL 6.1 8.0   Albumin      3.5 - 5.0 g/dL 2.8 (L) 4.0   Bilirubin Total      0.0 - 1.0 mg/dL 0.3 0.6   GFR Estimate      >60 mL/min/1.73m2  >90     Component      Latest Ref Rng & Units 2/22/2019 2/9/2022   HCG Qual Urine      Negative Negative Negative

## 2022-03-02 NOTE — TELEPHONE ENCOUNTER
"Patient spouse ([place] on file) notified per MD note below.     Patient spouse states that patient has some trouble breathing at night and doesn't sleep well. Spouse denies concerns about GI (eating) or throat (swelling). This has been an issue x 2 weeks. Patient also has \"bumps\"on her tongue.     Patient advised to contact RN line with increasing SOB or dyspnea. The patient verbalizes understanding of provider/CSS instructions for follow-up and continued care per provider message.     Patient re-appointed from 3/10/22 to 3/3/2022.   "

## 2022-03-02 NOTE — TELEPHONE ENCOUNTER
Labs are in general fine.  Has appointment with Dr. Salmeron in about 1.5 weeks and she can review in more detail.

## 2022-03-03 ENCOUNTER — OFFICE VISIT (OUTPATIENT)
Dept: FAMILY MEDICINE | Facility: CLINIC | Age: 27
End: 2022-03-03
Payer: COMMERCIAL

## 2022-03-03 VITALS
OXYGEN SATURATION: 99 % | TEMPERATURE: 97.9 F | HEIGHT: 60 IN | RESPIRATION RATE: 16 BRPM | SYSTOLIC BLOOD PRESSURE: 100 MMHG | BODY MASS INDEX: 24.74 KG/M2 | WEIGHT: 126 LBS | DIASTOLIC BLOOD PRESSURE: 62 MMHG | HEART RATE: 109 BPM

## 2022-03-03 DIAGNOSIS — L30.9 ECZEMA, UNSPECIFIED TYPE: ICD-10-CM

## 2022-03-03 DIAGNOSIS — Z30.016 ENCOUNTER FOR INITIAL PRESCRIPTION OF TRANSDERMAL PATCH HORMONAL CONTRACEPTIVE DEVICE: ICD-10-CM

## 2022-03-03 DIAGNOSIS — G47.00 INSOMNIA, UNSPECIFIED TYPE: ICD-10-CM

## 2022-03-03 DIAGNOSIS — K21.9 GASTROESOPHAGEAL REFLUX DISEASE WITHOUT ESOPHAGITIS: ICD-10-CM

## 2022-03-03 DIAGNOSIS — R53.83 OTHER FATIGUE: ICD-10-CM

## 2022-03-03 DIAGNOSIS — B18.1 CHRONIC HEPATITIS B VIRUS INFECTION (H): Primary | ICD-10-CM

## 2022-03-03 PROCEDURE — 99214 OFFICE O/P EST MOD 30 MIN: CPT | Performed by: FAMILY MEDICINE

## 2022-03-03 RX ORDER — TRIAMCINOLONE ACETONIDE 1 MG/G
OINTMENT TOPICAL 2 TIMES DAILY
Qty: 30 G | Refills: 0 | Status: SHIPPED | OUTPATIENT
Start: 2022-03-03 | End: 2023-02-16

## 2022-03-03 RX ORDER — NORELGESTROMIN AND ETHINYL ESTRADIOL 35; 150 UG/MG; UG/MG
PATCH TRANSDERMAL
Qty: 9 PATCH | Refills: 3 | Status: SHIPPED | OUTPATIENT
Start: 2022-03-03 | End: 2024-06-11

## 2022-03-03 NOTE — PROGRESS NOTES
"SUBJECTIVE  Paw EMILE Dubon is a 26 year old female here for:    Chief Complaint   Patient presents with     Follow Up     Fatigue and dizziness     Seen by Dr. Okeefe 2/9/22 for fatigue and dizziness and amenorrhea  UPT was negative at that time  She did have her period in February LMP 2/17/22  She is not interested in being pregnant- she would like to talk about birth control  She reports the nexplanon made her dizzy, but she would like to try the patch    Insomnia-  Unisom made her dizzy so she stopped  She sometimes wakes up frequently in the night  Denies any nightmares or mood concerns  Denies any relationship or family stressors    Sensation of something stuck in throat  Acid reflux  Burning in throat    Rash on left upper arm and left side of face  2 weeks  Itchy    ROS  See HPI    Reviewed Past Medical History, Medications, Family History and Social History in Epic and up to date with no new changes.    OBJECTIVE  /62   Pulse 109   Temp 97.9  F (36.6  C) (Oral)   Resp 16   Ht 1.511 m (4' 11.5\")   Wt 57.2 kg (126 lb)   LMP 02/17/2022 (Exact Date)   SpO2 99%   Breastfeeding No   BMI 25.02 kg/m       General: Cooperative, pleasant, in no acute distress  Psych: Normal mood and affect  Skin: Eczematous patch over left side of face, left upper arm, annular.    LABS & IMAGES   No results found for any visits on 03/03/22.      ASSESSMENT/PLAN:   Sinan was seen today for follow up.    Diagnoses and all orders for this visit:    Chronic Hepatitis, B Virus: Reviewed recent labs, including low viral load and normal LFTs. Continue annual surveillance for labs. No need for hepatoma screening yet.    Insomnia, unspecified type: did not tolerate unisom. No concerning symptoms for sleep apnea or mood disorder. Encouraged sleep hygiene.    Gastroesophageal reflux disease without esophagitis: Trial 1 month of PPI. If no improvement, consider further workup for globus sensation.  -     omeprazole (PRILOSEC) 20 MG DR" capsule; Take 1 capsule (20 mg) by mouth daily    Encounter for initial prescription of transdermal patch hormonal contraceptive device: Did not tolerate nexplanon due to side effects of dizziness. Reviewed options today and she would like to try patch. Reviewed how to use and effectiveness.   -     norelgestromin-ethinyl estradiol (ORTHO EVRA) 150-35 MCG/24HR patch; Remove old patch and apply new patch onto the skin once a week for 3 weeks (21 days). Do not wear patch week 4 (days 22-28), then repeat.    Eczema, unspecified type: Patch over left face and left upper arm. Use aquaphor 2-3 times per day. Topical steroid until resolved, then continue to use aquaphor.  -     triamcinolone (KENALOG) 0.1 % external ointment; Apply topically 2 times daily      Options for treatment and follow-up care were reviewed with the patient. Sinan Dubon and/or guardian was engaged and actively involved in the decision making process. Sinan Hernandezh and/or guardian verbalized understanding of the options discussed and was satisfied with the final plan.      Qiana Salmeron MD

## 2022-08-15 ENCOUNTER — TELEPHONE (OUTPATIENT)
Dept: FAMILY MEDICINE | Facility: CLINIC | Age: 27
End: 2022-08-15

## 2022-08-15 NOTE — TELEPHONE ENCOUNTER
Return call to spouse, Rio Wilkerson (consent to communicate on file). Spouse reported patient is out of state and will return home to [place] on  8/18/2022. Spouse is requesting an appointment with a provider to address red sores in vagina area.  An appointment has been made.    CARLA HinesN, RN  [facility]

## 2022-08-15 NOTE — TELEPHONE ENCOUNTER
Reason for call:  Patient reporting a symptom    Symptom or request: Red sores in vaginal area    Duration (how long have symptoms been present): 1 1/2 weeks    Have you been treated for this before? No    Additional comments:  would like her seen this week. No available openings this month please call to advise. Thank you.     Phone Number patient can be reached at:  Other phone number:  [phone]    Best Time:  any    Can we leave a detailed message on this number:  YES    Call taken on 8/15/2022 at 4:10 PM by Federica Monique

## 2023-01-28 ENCOUNTER — HOSPITAL ENCOUNTER (EMERGENCY)
Facility: HOSPITAL | Age: 28
Discharge: HOME | End: 2023-01-29
Attending: EMERGENCY MEDICINE | Admitting: EMERGENCY MEDICINE
Payer: COMMERCIAL

## 2023-01-28 ENCOUNTER — APPOINTMENT (OUTPATIENT)
Dept: RADIOLOGY | Facility: HOSPITAL | Age: 28
End: 2023-01-28
Attending: EMERGENCY MEDICINE
Payer: COMMERCIAL

## 2023-01-28 DIAGNOSIS — J02.0 STREPTOCOCCAL PHARYNGITIS: ICD-10-CM

## 2023-01-28 LAB
FLUAV RNA SPEC QL NAA+PROBE: NEGATIVE
FLUBV RNA RESP QL NAA+PROBE: NEGATIVE
RSV RNA SPEC NAA+PROBE: NEGATIVE
SARS-COV-2 RNA RESP QL NAA+PROBE: NEGATIVE

## 2023-01-28 PROCEDURE — 99284 EMERGENCY DEPT VISIT MOD MDM: CPT | Mod: CS,25

## 2023-01-28 PROCEDURE — 87637 SARSCOV2&INF A&B&RSV AMP PRB: CPT | Performed by: EMERGENCY MEDICINE

## 2023-01-28 PROCEDURE — 258N000003 HC RX IP 258 OP 636: Performed by: EMERGENCY MEDICINE

## 2023-01-28 PROCEDURE — 71046 X-RAY EXAM CHEST 2 VIEWS: CPT

## 2023-01-28 PROCEDURE — 87651 STREP A DNA AMP PROBE: CPT | Performed by: EMERGENCY MEDICINE

## 2023-01-28 PROCEDURE — 96374 THER/PROPH/DIAG INJ IV PUSH: CPT

## 2023-01-28 PROCEDURE — 250N000011 HC RX IP 250 OP 636: Performed by: EMERGENCY MEDICINE

## 2023-01-28 PROCEDURE — 96361 HYDRATE IV INFUSION ADD-ON: CPT

## 2023-01-28 PROCEDURE — C9803 HOPD COVID-19 SPEC COLLECT: HCPCS

## 2023-01-28 RX ORDER — KETOROLAC TROMETHAMINE 15 MG/ML
15 INJECTION, SOLUTION INTRAMUSCULAR; INTRAVENOUS ONCE
Status: COMPLETED | OUTPATIENT
Start: 2023-01-28 | End: 2023-01-28

## 2023-01-28 RX ADMIN — SODIUM CHLORIDE 500 ML: 9 INJECTION, SOLUTION INTRAVENOUS at 21:57

## 2023-01-28 RX ADMIN — KETOROLAC TROMETHAMINE 15 MG: 15 INJECTION, SOLUTION INTRAMUSCULAR; INTRAVENOUS at 22:01

## 2023-01-28 RX ADMIN — DEXAMETHASONE 10 MG: 2 TABLET ORAL at 22:33

## 2023-01-28 ASSESSMENT — ENCOUNTER SYMPTOMS
ABDOMINAL PAIN: 0
SORE THROAT: 1
NAUSEA: 0
DIFFICULTY URINATING: 0
FEVER: 1
VOMITING: 0
DYSURIA: 0
COUGH: 1
HEADACHES: 1

## 2023-01-28 ASSESSMENT — ACTIVITIES OF DAILY LIVING (ADL): ADLS_ACUITY_SCORE: 35

## 2023-01-29 VITALS
DIASTOLIC BLOOD PRESSURE: 61 MMHG | HEART RATE: 91 BPM | RESPIRATION RATE: 18 BRPM | BODY MASS INDEX: 25.24 KG/M2 | TEMPERATURE: 100.3 F | OXYGEN SATURATION: 98 % | WEIGHT: 127.1 LBS | SYSTOLIC BLOOD PRESSURE: 104 MMHG

## 2023-01-29 LAB — GROUP A STREP BY PCR: DETECTED

## 2023-01-29 PROCEDURE — 250N000013 HC RX MED GY IP 250 OP 250 PS 637: Performed by: EMERGENCY MEDICINE

## 2023-01-29 RX ADMIN — AMOXICILLIN AND CLAVULANATE POTASSIUM 1 TABLET: 875; 125 TABLET, FILM COATED ORAL at 00:15

## 2023-01-29 NOTE — ED TRIAGE NOTES
The pt presents for evaluation of subjective fever, sore throat, and headache since yesterday.

## 2023-01-29 NOTE — ED PROVIDER NOTES
EMERGENCY DEPARTMENT ENCOUNTER      NAME: Sinan Dubon  AGE: 27 year old female  YOB: 1995  MRN: [id number]  EVALUATION DATE & TIME: 2023  9:32 PM    PCP: Qiana Salmeron    ED PROVIDER: Balbina Peters M.D.      Chief Complaint   Patient presents with     Fever     Pharyngitis         FINAL IMPRESSION:  1. Streptococcal pharyngitis        MEDICAL DECISION MAKIN:42 PM I met with the patient, obtained history, performed an initial exam, and discussed options and plan for diagnostics and treatment here in the ED.   Pertinent Labs & Imaging studies reviewed. (See chart for details)     Sinan Dubon is a 27 year old female who presents with fever and sore throat.  Patient is febrile to 102.8.  Her tonsils do not look enlarged however posterior erythema is present in the oropharynx.  Other vital signs are reassuring.  She is otherwise healthy and has no significant risk factors for more serious disease.  I will test for COVID and influenza as well as strep swab.    COVID/influenza are negative.  Unfortunately, strep test required send out and will not be back in a reasonable timeframe.  I feel she is clinically positive for strep pharyngitis so will elect to treat.  If there is anything different or concerning when the results of her strep throat test return, I will contact the patient to discuss changes to the antibiotics.    I updated the patient on this plan of care.  She did defervesce after the NSAIDs and was comfortable going home.  We discussed warning signs and indications to return to the emergency department.  She understands these and all discharge instructions.    Medical Decision Making    History:    Supplemental history from: Documented in chart, if applicable    External Record(s) reviewed: Documented in chart, if applicable.    Work Up:    Chart documentation includes differential considered and any EKGs or imaging independently interpreted by provider, where specified.    In  additional to work up documented, I considered the following work up: Documented in chart, if applicable.    External consultation:    Discussion of management with another provider: Documented in chart, if applicable    Complicating factors:    Care impacted by chronic illness: N/A    Care affected by social determinants of health: N/A    Disposition considerations: Discharge. I prescribed additional prescription strength medication(s) as charted. N/A.        MEDICATIONS GIVEN IN THE EMERGENCY:  Medications   ketorolac (TORADOL) injection 15 mg (15 mg Intravenous Given 1/28/23 2201)   0.9% sodium chloride BOLUS (0 mLs Intravenous Stopped 1/28/23 2234)   dexamethasone (DECADRON) tablet 10 mg (10 mg Oral Given 1/28/23 2233)   amoxicillin-clavulanate (AUGMENTIN) 875-125 MG per tablet 1 tablet (1 tablet Oral Given 1/29/23 0015)       NEW PRESCRIPTIONS STARTED AT TODAY'S ER VISIT:  Discharge Medication List as of 1/29/2023 12:11 AM      START taking these medications    Details   amoxicillin-clavulanate (AUGMENTIN) 875-125 MG tablet Take 1 tablet by mouth 2 times daily for 7 days, Disp-13 tablet, R-0, Local Print                =================================================================    HPI    Patient information was obtained from: Patient     Use of : Yes (Phone ) - Language Vibha Dubon is a 27 year old female with a history of anemia who presents with fever and sore throat.      Patient reports sore throat, headache, and fever since yesterday.  She has been using Tylenol with no relief.  Last dose around 5 PM.  She has felt hot but denies taking her temperature so she does not know if she is actually had a fever.  She has no production with her cough.  She is able to handle her secretions and swallow.  She has slightly pleuritic chest pain when she coughs.  No belly pain, nausea or vomiting.  She generally is able to eat and drink.  No urinary symptoms.  Her last menstrual cycle was on the  20th and was normal.  She is a non-smoker.  No known sick contacts.  She has gotten both COVID and influenza.  She cannot recall how many COVID-vaccine she received.    REVIEW OF SYSTEMS   Review of Systems   Constitutional: Positive for fever.   HENT: Positive for sore throat.    Respiratory: Positive for cough.    Cardiovascular: Positive for chest pain (with cough).   Gastrointestinal: Negative for abdominal pain, nausea and vomiting.   Genitourinary: Negative for difficulty urinating and dysuria.   Neurological: Positive for headaches.   All other systems reviewed and are negative.       PAST MEDICAL HISTORY:  Past Medical History:   Diagnosis Date     Anemia 3/9/2015       PAST SURGICAL HISTORY:  Past Surgical History:   Procedure Laterality Date     OTHER SURGICAL HISTORY      negative       CURRENT MEDICATIONS:    No current facility-administered medications for this encounter.    Current Outpatient Medications:      amoxicillin-clavulanate (AUGMENTIN) 875-125 MG tablet, Take 1 tablet by mouth 2 times daily for 7 days, Disp: 13 tablet, Rfl: 0     norelgestromin-ethinyl estradiol (ORTHO EVRA) 150-35 MCG/24HR patch, Remove old patch and apply new patch onto the skin once a week for 3 weeks (21 days). Do not wear patch week 4 (days 22-28), then repeat., Disp: 9 patch, Rfl: 3     omeprazole (PRILOSEC) 20 MG DR capsule, Take 1 capsule (20 mg) by mouth daily, Disp: 30 capsule, Rfl: 1     triamcinolone (KENALOG) 0.1 % external ointment, Apply topically 2 times daily, Disp: 30 g, Rfl: 0    ALLERGIES:  No Known Allergies    FAMILY HISTORY:  Family History   Problem Relation Age of Onset     No Known Problems Mother      No Known Problems Father        SOCIAL HISTORY:   Social History     Tobacco Use     Smoking status: Never     Smokeless tobacco: Never   Substance Use Topics     Alcohol use: No     Drug use: No        PHYSICAL EXAM:    Vitals: /61   Pulse 91   Temp 100.3  F (37.9  C) (Oral)   Resp 18   Wt  57.7 kg (127 lb 1.6 oz)   SpO2 98%   BMI 25.24 kg/m     General:. Alert and interactive, comfortable appearing.  HENT: Oropharynx without erythema or exudates. Dry mucus membranes.  TMs clear bilaterally.  Eyes: Pupils mid-sized and equally reactive.   Neck: Full AROM.  No midline tenderness to palpation.  Cardiovascular: Tachycardic. Regular rhythm. Peripheral pulses 2+ bilaterally.  Chest/Pulmonary: Normal work of breathing. Lung sounds clear and equal throughout, no wheezes or crackles. No chest wall tenderness or deformities.  Abdomen: Soft, nondistended. Nontender without guarding or rebound.  Back/Spine: No CVA or midline tenderness.  Extremities: Normal ROM of all major joints. No lower extremity edema.   Skin: Hot to touch and dry. Normal skin color.   Neuro: Speech clear. CNs grossly intact. Moves all extremities appropriately. Strength and sensation grossly intact to all extremities.   Psych: Normal affect/mood, cooperative, memory appropriate.      LAB:  All pertinent labs reviewed and interpreted.  Labs Ordered and Resulted from Time of ED Arrival to Time of ED Departure   INFLUENZA A/B & SARS-COV2 PCR MULTIPLEX - Normal       Result Value    Influenza A PCR Negative      Influenza B PCR Negative      RSV PCR Negative      SARS CoV2 PCR Negative       Send out Strep--positive    RADIOLOGY:  Chest XR,  PA & LAT   Final Result   IMPRESSION: Negative chest.              I, Clyde Cantrell, am serving as a scribe to document services personally performed by Dr. Balbina Peters  based on my observation and the provider's statements to me. IBalbina MD attest that Clyde Cantrell is acting in a scribe capacity, has observed my performance of the services and has documented them in accordance with my direction.      Balbina Peters M.D.  Emergency Medicine  [facility] EMERGENCY DEPARTMENT  [facility] [facility] [address] [phone]  Dept:  [phone]         Balbina Peters MD  01/29/23 0452

## 2023-01-29 NOTE — DISCHARGE INSTRUCTIONS
Please complete antibiotics.  You are given your first dose of antibiotics tonight in the emergency department.  This should be taken approximately every 12 hours.  You can take your next dose at lunchtime tomorrow.    Please follow-up with your primary care doctor if you are not getting better.    Use Motrin 600 mg every 6 hours and alternate with Tylenol 1000 mg every 8 hours for fever and pain.    Return to the emergency department with difficulty breathing, difficulty swallowing or any other new/concerning symptoms.

## 2023-02-16 ENCOUNTER — OFFICE VISIT (OUTPATIENT)
Dept: FAMILY MEDICINE | Facility: CLINIC | Age: 28
End: 2023-02-16
Payer: COMMERCIAL

## 2023-02-16 ENCOUNTER — TELEPHONE (OUTPATIENT)
Dept: FAMILY MEDICINE | Facility: CLINIC | Age: 28
End: 2023-02-16

## 2023-02-16 VITALS
HEIGHT: 60 IN | WEIGHT: 126.5 LBS | DIASTOLIC BLOOD PRESSURE: 62 MMHG | RESPIRATION RATE: 16 BRPM | BODY MASS INDEX: 24.84 KG/M2 | TEMPERATURE: 98.7 F | SYSTOLIC BLOOD PRESSURE: 90 MMHG | HEART RATE: 76 BPM | OXYGEN SATURATION: 99 %

## 2023-02-16 DIAGNOSIS — Z12.4 PAP SMEAR FOR CERVICAL CANCER SCREENING: ICD-10-CM

## 2023-02-16 DIAGNOSIS — B18.1 CHRONIC HEPATITIS B VIRUS INFECTION (H): ICD-10-CM

## 2023-02-16 DIAGNOSIS — J02.0 STREPTOCOCCAL PHARYNGITIS: Primary | ICD-10-CM

## 2023-02-16 DIAGNOSIS — N89.8 VAGINAL ITCHING: ICD-10-CM

## 2023-02-16 DIAGNOSIS — J02.9 SORE THROAT: ICD-10-CM

## 2023-02-16 DIAGNOSIS — N76.0 BACTERIAL VAGINOSIS: Primary | ICD-10-CM

## 2023-02-16 DIAGNOSIS — K59.00 CONSTIPATION, UNSPECIFIED CONSTIPATION TYPE: ICD-10-CM

## 2023-02-16 DIAGNOSIS — B37.31 YEAST VAGINITIS: ICD-10-CM

## 2023-02-16 DIAGNOSIS — G44.219 EPISODIC TENSION-TYPE HEADACHE, NOT INTRACTABLE: Primary | ICD-10-CM

## 2023-02-16 DIAGNOSIS — B96.89 BACTERIAL VAGINOSIS: Primary | ICD-10-CM

## 2023-02-16 DIAGNOSIS — Z76.0 ENCOUNTER FOR MEDICATION REFILL: ICD-10-CM

## 2023-02-16 LAB
ALBUMIN SERPL BCG-MCNC: 4.4 G/DL (ref 3.5–5.2)
ALP SERPL-CCNC: 49 U/L (ref 35–104)
ALT SERPL W P-5'-P-CCNC: 22 U/L (ref 10–35)
AST SERPL W P-5'-P-CCNC: 27 U/L (ref 10–35)
BILIRUB DIRECT SERPL-MCNC: <0.2 MG/DL (ref 0–0.3)
BILIRUB SERPL-MCNC: 0.4 MG/DL
CLUE CELLS: PRESENT
DEPRECATED S PYO AG THROAT QL EIA: POSITIVE
PROT SERPL-MCNC: 7.5 G/DL (ref 6.4–8.3)
TRICHOMONAS, WET PREP: ABNORMAL
WBC'S/HIGH POWER FIELD, WET PREP: ABNORMAL
YEAST, WET PREP: PRESENT

## 2023-02-16 PROCEDURE — 36415 COLL VENOUS BLD VENIPUNCTURE: CPT | Performed by: FAMILY MEDICINE

## 2023-02-16 PROCEDURE — 80076 HEPATIC FUNCTION PANEL: CPT | Performed by: FAMILY MEDICINE

## 2023-02-16 PROCEDURE — 99215 OFFICE O/P EST HI 40 MIN: CPT | Performed by: FAMILY MEDICINE

## 2023-02-16 PROCEDURE — 87517 HEPATITIS B DNA QUANT: CPT | Performed by: FAMILY MEDICINE

## 2023-02-16 PROCEDURE — G0145 SCR C/V CYTO,THINLAYER,RESCR: HCPCS | Performed by: FAMILY MEDICINE

## 2023-02-16 PROCEDURE — 87210 SMEAR WET MOUNT SALINE/INK: CPT | Performed by: FAMILY MEDICINE

## 2023-02-16 PROCEDURE — 87880 STREP A ASSAY W/OPTIC: CPT | Performed by: FAMILY MEDICINE

## 2023-02-16 RX ORDER — METRONIDAZOLE 500 MG/1
500 TABLET ORAL 2 TIMES DAILY
Qty: 14 TABLET | Refills: 0 | Status: SHIPPED | OUTPATIENT
Start: 2023-02-16 | End: 2023-02-23

## 2023-02-16 RX ORDER — PNV NO.95/FERROUS FUM/FOLIC AC 28MG-0.8MG
1 TABLET ORAL DAILY
Qty: 90 TABLET | Refills: 3 | Status: SHIPPED | OUTPATIENT
Start: 2023-02-16 | End: 2024-06-11

## 2023-02-16 RX ORDER — NAPROXEN 250 MG/1
250 TABLET ORAL 2 TIMES DAILY PRN
Qty: 60 TABLET | Refills: 1 | Status: SHIPPED | OUTPATIENT
Start: 2023-02-16 | End: 2023-03-29

## 2023-02-16 RX ORDER — FLUCONAZOLE 150 MG/1
150 TABLET ORAL ONCE
Qty: 1 TABLET | Refills: 0 | Status: SHIPPED | OUTPATIENT
Start: 2023-02-16 | End: 2023-02-16

## 2023-02-16 RX ORDER — CEFDINIR 300 MG/1
300 CAPSULE ORAL 2 TIMES DAILY
Qty: 10 CAPSULE | Refills: 0 | Status: SHIPPED | OUTPATIENT
Start: 2023-02-16 | End: 2023-02-21

## 2023-02-16 RX ORDER — DOCUSATE SODIUM 100 MG/1
100 CAPSULE, LIQUID FILLED ORAL DAILY
Qty: 90 CAPSULE | Refills: 3 | Status: SHIPPED | OUTPATIENT
Start: 2023-02-16 | End: 2024-06-11

## 2023-02-16 ASSESSMENT — ENCOUNTER SYMPTOMS: HEADACHES: 1

## 2023-02-16 NOTE — LETTER
"February 20, 2023      Sinan Dubon  [address] [place] [address]        Dear ,    We are writing to inform you of your test results.    Test results indicate you may require additional follow up, see comment below.    \" She tested positive again for strep. I will give her a different antibiotic to take twice daily for 5 days.\"    Resulted Orders   Streptococcus A Rapid Screen w/Reflex to PCR - Clinic Collect   Result Value Ref Range    Group A Strep antigen Positive (A) Negative   Wet prep - Clinic Collect   Result Value Ref Range    Trichomonas Absent Absent    Yeast Present (A) Absent    Clue Cells Present (A) Absent    WBCs/high power field 4+ (A) None       If you have any questions or concerns, please call the clinic at the number listed above.       Sincerely,        Dr. Salmeron        "

## 2023-02-16 NOTE — TELEPHONE ENCOUNTER
----- Message from Qiana Salmeron MD sent at 2/16/2023 11:25 AM CST -----  Team - please call patient with results.    She tested positive again for strep. I will give her a different antibiotic to take twice daily for 5 days.

## 2023-02-16 NOTE — TELEPHONE ENCOUNTER
"Called pt in an attempt to relay lab result message. VM stated \"the person you're calling is unable to received calls at this time. Please try again later\"    - will try again another time.    Boo Galan Cem Say, BSN RN  [facility]      ----- Message from Qiana Salmeron MD sent at 2/16/2023 11:09 AM CST -----  Team - please call patient with results.    She had yeast and bacterial vaginosis on her swab- these are NOT sexually transmitted infections and are both treatable. I will send prescription for 1 pill to your pharmacy to treat the yeast, and then a 7 day course of antibiotics to take to treat the imbalance of bacteria.     "

## 2023-02-16 NOTE — PROGRESS NOTES
Sinan was seen today for headache.    Diagnoses and all orders for this visit:    Episodic tension-type headache, not intractable: worse around menstrual cycles- will start with NSAIDS. Reviewed to stay hydrated, adequate sleep, regular meals. Continue to monitor  -     naproxen (NAPROSYN) 250 MG tablet; Take 1 tablet (250 mg) by mouth 2 times daily as needed for moderate pain (4-6)    Chronic hepatitis B virus infection (H): Check annual labs. No hepatoma screening based on her age.  -     Hep B Virus DNA Quant Real Time PCR; Future  -     Hepatic panel (Albumin, ALT, AST, Bili, Alk Phos, TP); Future  -     Hep B Virus DNA Quant Real Time PCR  -     Hepatic panel (Albumin, ALT, AST, Bili, Alk Phos, TP)    Encounter for medication refill: She is sexually active and not currently using contraception- she is not sure if she wants to be pregnant but declines any formal contraception. Reviewed importance of starting prenatal vitamin.  -     Prenatal Vit-Fe Fumarate-FA (PRENATAL VITAMIN) 27-0.8 MG TABS; Take 1 tablet by mouth daily    Constipation, unspecified constipation type: Increase water intake, advised daily fiber supplement, and use stool softener daily.  -     docusate sodium (COLACE) 100 MG capsule; Take 1 capsule (100 mg) by mouth daily    Sore throat: Treated for strep in ER two weeks ago, still with residual symptoms. Check for strep.  -     Streptococcus A Rapid Screen w/Reflex to PCR - Clinic Collect    Pap smear for cervical cancer screening  -     Pap screen reflex to HPV if ASCUS - recommend age 25 - 29    Vaginal itching: Wet prep pending  -     Wet prep - Clinic Collect      50 minutes spent on the date of the encounter doing chart review, history and exam, documentation and further activities as noted above      Subjective   Sinan is a 27 year old, presenting for the following health issues:  Headache      Headache          Headache for 1 month  Mostly about 7 days duration around her menstrual  "cycles  Across her entire head, pressure, throbbing  Occasional nausea  No vomiting    Due for pap smear  She reports vaginal itching, white discharge    History of strep throat- went to ER a few weeks ago and was treated  Still having sore throat    She is not using birth control patch anymore  Not sure if she wants to be pregnant or not, but is not interested in using birth control    History of chronic hep B  Due for labs      Review of Systems   Neurological: Positive for headaches.            Objective    BP 90/62 (BP Location: Left arm, Patient Position: Sitting, Cuff Size: Adult Regular)   Pulse 76   Temp 98.7  F (37.1  C) (Oral)   Resp 16   Ht 1.53 m (5' 0.24\")   Wt 57.4 kg (126 lb 8 oz)   LMP 01/26/2023 (Approximate)   SpO2 99%   BMI 24.51 kg/m    Body mass index is 24.51 kg/m .  Physical Exam   HEENT: mild posterior pharyngeal erythema, no exudates   (female): External genitalia is without lesions. Introitus is normal, vaginal walls pink and moist without lesions or evidence of trauma. No cervical motion tenderness. No adnexal masses. No cervical lesions.                  "

## 2023-02-17 LAB
HBV DNA SERPL NAA+PROBE-ACNC: 144 IU/ML
HBV DNA SERPL NAA+PROBE-LOG IU: 2.2 {LOG_IU}/ML

## 2023-02-17 NOTE — TELEPHONE ENCOUNTER
"Writer attempt #2 to call patient with the help of a Vibha  regarding provider's message below.  states, \"the person you're calling is unable to received calls at this time. Please try again later\"    Will attempt to contact pt another time.    CARLA HernandezN, RN   [facility]    "

## 2023-02-20 NOTE — TELEPHONE ENCOUNTER
"Writer attempt #3 to call patient with the help of a Vibha  regarding provider's message below.  states, \"the person you're calling is unable to received calls at this time. Please try again later\"     Third attempt, letter will be sent.    Closing encounter.     CARLA HernandezN, RN              [facility]     "

## 2023-02-21 LAB
BKR LAB AP GYN ADEQUACY: NORMAL
BKR LAB AP GYN INTERPRETATION: NORMAL
BKR LAB AP HPV REFLEX: NORMAL
BKR LAB AP LMP: NORMAL
BKR LAB AP PREVIOUS ABNORMAL: NORMAL
PATH REPORT.COMMENTS IMP SPEC: NORMAL
PATH REPORT.COMMENTS IMP SPEC: NORMAL
PATH REPORT.RELEVANT HX SPEC: NORMAL

## 2023-03-28 DIAGNOSIS — G44.219 EPISODIC TENSION-TYPE HEADACHE, NOT INTRACTABLE: ICD-10-CM

## 2023-03-29 RX ORDER — NAPROXEN 250 MG/1
TABLET ORAL
Qty: 60 TABLET | Refills: 1 | Status: SHIPPED | OUTPATIENT
Start: 2023-03-29 | End: 2024-06-11

## 2023-03-29 NOTE — TELEPHONE ENCOUNTER
"Routing refill request to provider for review/approval because:  Labs not current:  Multiple    Last Written Prescription Date:  2/16/23  Last Fill Quantity: 60,  # refills: 1   Last office visit provider:  2/16/23     Requested Prescriptions   Pending Prescriptions Disp Refills     naproxen (NAPROSYN) 250 MG tablet [Pharmacy Med Name: NAPROXEN 250MG TABLETS] 60 tablet 1     Sig: TAKE 1 TABLET(250 MG) BY MOUTH TWICE DAILY AS NEEDED FOR MODERATE PAIN       NSAID Medications Failed - 3/29/2023 12:05 PM        Failed - Normal CBC on file in past 12 months     Recent Labs   Lab Test 02/09/22  1239   WBC 8.1   RBC 4.60   HGB 12.7   HCT 38.3                    Failed - Normal serum creatinine on file in past 12 months     Recent Labs   Lab Test 02/09/22  1238   CR 0.69       Ok to refill medication if creatinine is low          Passed - Blood pressure under 140/90 in past 12 months     BP Readings from Last 3 Encounters:   02/16/23 90/62   01/29/23 104/61   03/03/22 100/62                 Passed - Normal ALT on file in past 12 months     Recent Labs   Lab Test 02/16/23  1056   ALT 22             Passed - Normal AST on file in past 12 months     Recent Labs   Lab Test 02/16/23  1056   AST 27             Passed - Recent (12 mo) or future (30 days) visit within the authorizing provider's specialty     Patient has had an office visit with the authorizing provider or a provider within the authorizing providers department within the previous 12 mos or has a future within next 30 days. See \"Patient Info\" tab in inbasket, or \"Choose Columns\" in Meds & Orders section of the refill encounter.              Passed - Patient is age 6-64 years        Passed - Medication is active on med list        Passed - No active pregnancy on record        Passed - No positive pregnancy test in past 12 months             Martinez Steinberg RN 03/29/23 12:05 PM  "

## 2024-05-30 LAB
ABO/RH(D): NORMAL
ANTIBODY SCREEN: NEGATIVE
SPECIMEN EXPIRATION DATE: NORMAL

## 2024-05-31 ENCOUNTER — PRENATAL OFFICE VISIT (OUTPATIENT)
Dept: MIDWIFE SERVICES | Facility: CLINIC | Age: 29
End: 2024-05-31
Attending: ADVANCED PRACTICE MIDWIFE
Payer: COMMERCIAL

## 2024-05-31 VITALS
WEIGHT: 124 LBS | BODY MASS INDEX: 24.35 KG/M2 | HEART RATE: 95 BPM | HEIGHT: 60 IN | DIASTOLIC BLOOD PRESSURE: 64 MMHG | SYSTOLIC BLOOD PRESSURE: 108 MMHG | OXYGEN SATURATION: 99 %

## 2024-05-31 DIAGNOSIS — B18.1 CHRONIC HEPATITIS B VIRUS INFECTION (H): ICD-10-CM

## 2024-05-31 DIAGNOSIS — Z34.92 PRENATAL CARE IN SECOND TRIMESTER: Primary | ICD-10-CM

## 2024-05-31 LAB
BASOPHILS # BLD AUTO: 0 10E3/UL (ref 0–0.2)
BASOPHILS NFR BLD AUTO: 0 %
EOSINOPHIL # BLD AUTO: 0.1 10E3/UL (ref 0–0.7)
EOSINOPHIL NFR BLD AUTO: 1 %
ERYTHROCYTE [DISTWIDTH] IN BLOOD BY AUTOMATED COUNT: 13.1 % (ref 10–15)
HCT VFR BLD AUTO: 37.9 % (ref 35–47)
HGB BLD-MCNC: 13 G/DL (ref 11.7–15.7)
IMM GRANULOCYTES # BLD: 0 10E3/UL
IMM GRANULOCYTES NFR BLD: 0 %
LYMPHOCYTES # BLD AUTO: 2.1 10E3/UL (ref 0.8–5.3)
LYMPHOCYTES NFR BLD AUTO: 30 %
MCH RBC QN AUTO: 28.3 PG (ref 26.5–33)
MCHC RBC AUTO-ENTMCNC: 34.3 G/DL (ref 31.5–36.5)
MCV RBC AUTO: 83 FL (ref 78–100)
MONOCYTES # BLD AUTO: 0.3 10E3/UL (ref 0–1.3)
MONOCYTES NFR BLD AUTO: 5 %
NEUTROPHILS # BLD AUTO: 4.5 10E3/UL (ref 1.6–8.3)
NEUTROPHILS NFR BLD AUTO: 64 %
PLATELET # BLD AUTO: 198 10E3/UL (ref 150–450)
RBC # BLD AUTO: 4.59 10E6/UL (ref 3.8–5.2)
RUBV IGG SERPL QL IA: 11.8 INDEX
RUBV IGG SERPL QL IA: POSITIVE
T PALLIDUM AB SER QL: NONREACTIVE
WBC # BLD AUTO: 7 10E3/UL (ref 4–11)

## 2024-05-31 PROCEDURE — 86762 RUBELLA ANTIBODY: CPT | Performed by: ADVANCED PRACTICE MIDWIFE

## 2024-05-31 PROCEDURE — 86850 RBC ANTIBODY SCREEN: CPT | Performed by: ADVANCED PRACTICE MIDWIFE

## 2024-05-31 PROCEDURE — 86803 HEPATITIS C AB TEST: CPT | Performed by: ADVANCED PRACTICE MIDWIFE

## 2024-05-31 PROCEDURE — 86780 TREPONEMA PALLIDUM: CPT | Performed by: ADVANCED PRACTICE MIDWIFE

## 2024-05-31 PROCEDURE — 87341 HEP B SURFACE AG NEUTRLZJ IA: CPT | Performed by: ADVANCED PRACTICE MIDWIFE

## 2024-05-31 PROCEDURE — 87389 HIV-1 AG W/HIV-1&-2 AB AG IA: CPT | Performed by: ADVANCED PRACTICE MIDWIFE

## 2024-05-31 PROCEDURE — 87086 URINE CULTURE/COLONY COUNT: CPT | Performed by: ADVANCED PRACTICE MIDWIFE

## 2024-05-31 PROCEDURE — 99203 OFFICE O/P NEW LOW 30 MIN: CPT | Performed by: ADVANCED PRACTICE MIDWIFE

## 2024-05-31 PROCEDURE — 86900 BLOOD TYPING SEROLOGIC ABO: CPT | Performed by: ADVANCED PRACTICE MIDWIFE

## 2024-05-31 PROCEDURE — 87340 HEPATITIS B SURFACE AG IA: CPT | Performed by: ADVANCED PRACTICE MIDWIFE

## 2024-05-31 PROCEDURE — 85025 COMPLETE CBC W/AUTO DIFF WBC: CPT | Performed by: ADVANCED PRACTICE MIDWIFE

## 2024-05-31 PROCEDURE — 36415 COLL VENOUS BLD VENIPUNCTURE: CPT | Performed by: ADVANCED PRACTICE MIDWIFE

## 2024-05-31 PROCEDURE — 86901 BLOOD TYPING SEROLOGIC RH(D): CPT | Performed by: ADVANCED PRACTICE MIDWIFE

## 2024-05-31 NOTE — PROGRESS NOTES
is a partnered  3 para 2 0 0 2  with a LMP of of 24 giving an EDC by LMP of 24 who presents for a new OB Visit.   She has not had bleeding since her LMP.  She has not had nausea.. Weigh loss   has not occurred.. She denies fever, chills and viral infections since her last LMP. There is mildfatigue.   She is a previous CNM patient.  Accompanied by partner Lynda.   =========================================    INFECTION HISTORY  HIV: no  Hepatitis B: Yes - does not need medication   Hepatitis C: no  Tuberculosis: no    Herpes self: no  Herpes partner:  no   Chlamydia: no, declines testing   Gonorrhea: no, declines testing   Trichomonis: no  Syphilis:  no  ============================================    Low Dose Aspirin for Preeclampsia Prevention  Assessment:    Consider for those with 1 high risk or 2  moderate risk factors         High risk: previous pregnancy with preeclampsia, multifetal gestation, chronic htn, diabetes, chronic kidney disease, autoimmune disorder         Medium risk: nulliparity, BMI >30, family hx preeclampsia, age >/= 35,  race, low SES, personal risk factors (hx low birth weight, hx stillbirth, >10yrs between pregnancies).         Patient does not qualify for low dose aspirin therapy    PERSONAL/SOCIAL HISTORY  Lives lives with their family.  =============================================    REVIEW OF SYSTEMS  CONSTITUTIONAL: Denies fever, chills and night sweats  DIET: Appetite is continue.  Eats a regular.     Plans to gain 25-35 pounds with her pregnancy.  SKIN: Denies changes and suspicious moles or lesions.  ENT: Denies blurred vision, hearing loss, tinnitus, frequent colds, epistaxis, hoarseness and tooth pain.    ENDOCRINE: Denies heat and cold intolerance, and polydypsia.    BREASTS:  Tender since LMP.  Denies discharge and lumps.   HEART/LUNGS: Denies dyspnea, wheezing, coughing and chest pain.  HEMATOLOGIC: Denies tendency to bruise and history of  blood clots.  GASTROINTESTINAL: Denies heartburn, indigestion and change of color in stools.    GENITOURINARY:  Denies urgency, dysuria and hematuria.  Has frequency of urination since LMP.   NEUROLOGIC:  Denies seizures, weakness and fainting.  PSYCHIATRIC:  Denies mood changes  ===========================================    PHYSICAL EXAM  GENERAL:   pleasant pregnant female, alert, cooperative  and well groomed.  SKIN:  Warm and dry, without lesions or tenderness.    HEAD: Symmetrical features.  EYES:  PERRLA, wears no corective lenses.  EARS: Tympanic membranes gray, translucent and intact.  NOSE: No flaring, patent  MOUTH:  Buccal mucosa pink, moist without lesions.  Teeth in fair repair.    NECK:  Thyroid without enlargement and nodules.  Lymph nodes not palpable.   LUNGS:  Clear to auscultation.  BREAST: Exam deferred   HEART:  RRR without murmur.  ABDOMEN: Soft without masses or tenderness.  No CVA tenderness.  FHT found.  MUSCULOSKELETAL:  Full range of motion  GENITALIA: EXTREMITIES:  2+/2+ DTR, No edema. No significant varicosities.  ===================================================  Prenatal care 2nd Trimester  History of Hep B      PLAN:  History of Hep B.  Initial lab ordered.  Additional labs as appropriate and contact with [facility] as needed.   Instructed on use of triage line and contacting the on call CNM after hours in an emergency.    Discussed the indications, uses for and false positives for genetic testing and fetal survey ultrasounds at 18-20 weeks gestation. Genetic testing offered and accepted.  Dating US offered and accepted.   They would like to go to [place] for prenatal care.    Will return to the clinic in 4 weeks for her next routine prenatal check.  Will call to be seen sooner if problem arise.  Oriented to CNM Service.    Ratna Mendoza, TOD, APRN, CNM

## 2024-06-01 LAB
BACTERIA UR CULT: NORMAL
HBV SURFACE AG SERPL QL IA: REACTIVE
HCV AB SERPL QL IA: NONREACTIVE
HIV 1+2 AB+HIV1 P24 AG SERPL QL IA: NONREACTIVE

## 2024-06-03 DIAGNOSIS — B18.1 CHRONIC HEPATITIS B VIRUS INFECTION (H): Primary | ICD-10-CM

## 2024-06-11 ENCOUNTER — PRENATAL OFFICE VISIT (OUTPATIENT)
Dept: FAMILY MEDICINE | Facility: CLINIC | Age: 29
End: 2024-06-11
Payer: MEDICAID

## 2024-06-11 VITALS
DIASTOLIC BLOOD PRESSURE: 69 MMHG | HEART RATE: 90 BPM | SYSTOLIC BLOOD PRESSURE: 108 MMHG | TEMPERATURE: 99.3 F | HEIGHT: 60 IN | RESPIRATION RATE: 20 BRPM | BODY MASS INDEX: 23.97 KG/M2 | WEIGHT: 122.12 LBS | OXYGEN SATURATION: 99 %

## 2024-06-11 DIAGNOSIS — B18.1 CHRONIC HEPATITIS B VIRUS INFECTION (H): ICD-10-CM

## 2024-06-11 DIAGNOSIS — O21.9 NAUSEA AND VOMITING DURING PREGNANCY: ICD-10-CM

## 2024-06-11 DIAGNOSIS — Z34.82 ENCOUNTER FOR SUPERVISION OF OTHER NORMAL PREGNANCY IN SECOND TRIMESTER: Primary | ICD-10-CM

## 2024-06-11 LAB — SCANNED LAB RESULT: NORMAL

## 2024-06-11 PROCEDURE — 99214 OFFICE O/P EST MOD 30 MIN: CPT | Performed by: FAMILY MEDICINE

## 2024-06-11 RX ORDER — PYRIDOXINE HCL (VITAMIN B6) 25 MG
TABLET ORAL
Qty: 40 TABLET | Refills: 2 | Status: SHIPPED | OUTPATIENT
Start: 2024-06-11 | End: 2024-07-17

## 2024-06-11 RX ORDER — PNV NO.95/FERROUS FUM/FOLIC AC 28MG-0.8MG
1 TABLET ORAL DAILY
Qty: 90 TABLET | Refills: 3 | Status: SHIPPED | OUTPATIENT
Start: 2024-06-11 | End: 2024-07-17

## 2024-06-11 NOTE — PROGRESS NOTES
PRENATAL VISIT:  INITIAL OB    Assessment /Plan     Sinan Dubon is a 29 year old  at 14w6d with an EDC of 2024, by Last Menstrual Period, here for Initial OB Appointment.    Encounter for supervision of other normal pregnancy in second trimester  Patient would like to have prenatal care and delivery with her primary physician, Dr. Salmeron, who took care of her during her last pregnancy and is also the physician to her children.  She has had most of her first OB labs already.  Will get an ultrasound to confirm dates and we will get her set up here in clinic for regular visits.  No major risk factors identified today with exceptions below.  -     US OB > 14 Weeks; Future  -     Prenatal Vit-Fe Fumarate-FA (PRENATAL VITAMIN) 27-0.8 MG TABS; Take 1 tablet by mouth daily    Nausea and vomiting during pregnancy  Patient will start with vitamin B6 and Unisom, but will call clinic if not improving in the next several days at which point could try adding Zofran.  -     pyridOXINE (VITAMIN B6) 25 MG tablet; Take 1-2 tablets 3 times daily as needed for nausea.  -     doxylamine (UNISOM) 25 MG TABS tablet; Take 1/2-1 tablet at bedtime to prevent nausea.  May repeat in the morning (but will cause sleepiness)    Chronic hepatitis B virus infection (H)  Patient has known chronic hepatitis B.  Would get hepatitis B viral load and hepatic profile with future labs; unable to add onto previous sample and I do not think it makes sense to redraw her today as we would not likely take any action at this point pregnancy anyway       Will use the following method to dates:  LMP for now.  Of note, her LMP was initially written down for , but today she tells me that the first day of her last period was .  That makes her 14 weeks 6 days today, which makes sense with exam although she says she is feeling fetal movement which would put her further along.  Will get ultrasound to clarify.  NonInvasive Prenatal  Screening (NIPS/Invitae).  Patient had this lab test drawn already and it was normal.  Reviewed results.  Per her preference, I have written baby sex down on a piece of paper and seal digit envelope for her to review at a later time.  Notable risk factors:  no/limited English skills, Chronic Hep B.      Preeclampsia risk factors (per ACOG Dec 2021 Guidelines):  High risk factors (1+): none  Moderate risk factors (2+): none  Based on her risk factors, Sinan is NOT at high risk of preeclampsia. Low-dose aspirin prophylaxis is NOT recommended for prevention of preeclampsia.     Discussed orientation, general information, lifestyle, nutrition, exercise,warning signs, resources, lab testing, risk screening.  Questions answered.    Return to clinic in about 1 month for next OB visit.    32 minutes Time spent by me doing chart review, history and exam, documentation and further activities per the note     Subjective:    Sinan Dubon is a 29 year old  here today for her First Obstetrical Exam.     She is doing well.  Here for first OB visit.  She has a friend here interpreting for her and helping her out General with advocacy.    She/friend report that she had called to make an appointment here at our clinic but had somehow gotten scheduled over at a different clinic.  Records reviewed and she was scheduled with the midwives.  Her first child was delivered through the midwives but that is when they came to our clinic.  For her more recent pregnancy, she saw Dr. Salmeron here and reports that she had told the person on the phone that she wanted to see Dr. Salmeron again.  Today, she stopped by her friend just trying to get things clarified and I offered to see her so we could get everything going.    She reports feeling some fetal movement  Reports that her last menstrual period started on February 28 rather than the February 20 we had written down.  She is very sure about the date.    Her friend notes that she has some trouble  sometimes advocating for herself and has some trouble with memory, so that is why she came with her.  She also reports that there was some difficulty understanding what was going on in the last visit, possibly due to telephone  issue.    She is having some nausea and vomiting.  Did not really have this much with her previous pregnancies.  Reports that she has lost 8 pounds.    OB History    Para Term  AB Living   3 2 2 0 0 2   SAB IAB Ectopic Multiple Live Births   0 0 0 0 2      # Outcome Date GA Lbr Khris/2nd Weight Sex Type Anes PTL Lv   3 Current            2 Term 18 40w5d 03:45 / 00:14 3.742 kg (8 lb 4 oz) M Vag-Spont Local N LANCE      Name: SHANTEL,MALE-PAW      Apgar1: 8  Apgar5: 9   1 Term 05/21/15 40w4d 07:46 / 00:22 3.459 kg (7 lb 10 oz) F Vag-Spont Local N LANCE      Name: SHANTEL,FEMALE-PAW      Apgar1: 8  Apgar5: 9       Past Medical History:   Diagnosis Date    Anemia 3/9/2015     Past Surgical History:   Procedure Laterality Date    OTHER SURGICAL HISTORY      negative     Social History     Tobacco Use    Smoking status: Never     Passive exposure: Never    Smokeless tobacco: Never   Vaping Use    Vaping status: Never Used   Substance Use Topics    Alcohol use: No    Drug use: No     Current Outpatient Medications   Medication Sig Dispense Refill    doxylamine (UNISOM) 25 MG TABS tablet Take 1/2-1 tablet at bedtime to prevent nausea.  May repeat in the morning (but will cause sleepiness) 30 tablet 2    Prenatal Vit-Fe Fumarate-FA (PRENATAL VITAMIN) 27-0.8 MG TABS Take 1 tablet by mouth daily 90 tablet 3    pyridOXINE (VITAMIN B6) 25 MG tablet Take 1-2 tablets 3 times daily as needed for nausea. 40 tablet 2     No Known Allergies    Family History   Problem Relation Age of Onset    No Known Problems Mother     No Known Problems Father        Objective:   Objective    Vitals:    24 1357   BP: 108/69   Pulse: 90   Resp: 20   Temp: 99.3  F (37.4  C)   TempSrc: Oral   SpO2: 99%  "  Weight: 55.4 kg (122 lb 1.9 oz)   Height: 1.52 m (4' 11.84\")     Physical Exam:  General Appearance: Alert, cooperative, no distress, appears stated age  Head: Normocephalic, without obvious abnormality, atraumatic  Neck: Supple, symmetrical, trachea midline, no adenopathy;  thyroid: not enlarged, symmetric, no tenderness/mass/nodules  Back: Symmetric, no curvature, ROM normal, no CVA tenderness  Lungs: Clear to auscultation bilaterally, respirations unlabored  Heart: Regular rate and rhythm, S1 and S2 normal, no murmur, rub, or gallop.  Abdomen: Soft, non-tender, bowel sounds active all four quadrants,  no masses, no organomegaly  Extremities: Extremities normal, atraumatic, no cyanosis or edema  Skin: Skin color, texture, turgor normal, no rashes or lesions  Lymph nodes: Cervical, supraclavicular, and axillary nodes normal  Neurologic: Normal          "

## 2024-06-11 NOTE — PATIENT INSTRUCTIONS
Patient Education   HEALTHY PREGNANCY CARE: 10-14 WEEKS PREGNANT     By weeks 10 to 14 of your pregnancy, the placenta has formed inside your uterus. It may be possible to hear your baby's heartbeat with a doppler ultrasound device. Your baby's eyes can and do move. The arms and legs can bend.    The second trimester genetic screening tests for Down's Syndrome, Trisomy 18, and neural tube defects (which are known collectively as a quad screen) are done at 15 to 22 weeks. It's your choice whether to have these tests. You and your partner can talk to your midwife or physician about birth defects tests.    Consider breastfeeding for the healthiest way to feed your baby. Ask your midwife or physician for more information.     As your center of gravity and weight changes, use good body mechanics when changing positions and lifting. For example, use a straight back and your legs for support when lifting instead of bending over. Maintain good posture to prevent straining your muscles. Now is a good time to continue or restart your exercise program. Walking 30-60 minutes daily is an excellent way to keep fit. Yoga and swimming also offer many benefits.    The nausea and fatigue of early pregnancy have usually started to let up, so this is a good time to focus on nutrition. Consider attending a nutrition class. A healthy diet includes about 60 grams of protein each day (3-4 servings of dairy, 2-3 servings of meat/fish/poultry/nuts), 4-6 servings of whole grain foods, and 5-6 servings of fruits and vegetables. Remember to drink 6-8 glasses of water daily.    Watch for warning signs, such as   vaginal bleeding  fluid leaking from your vagina  severe abdominal pain  nausea and vomiting more than 4-5 times a day, or if you are unable to keep anything down  fever more than 100.4 degrees F.     Contact your midwife or physician at if you have these or any other concerns. If it's after clinic hours, physician patients should call  the [facility] at [phone] ([phone]); midwife patients should call their answering service at [phone].              Ways to reduce pregnancy-induced nausea.  1. Eating as soon as you feel hungry, or even before you feel hungry   2. Snacking often and eating small meals. The best foods to eat have lots of protein or carbohydrates, but not a lot of fat. Good choices are crackers, bread, and low-fat yogurt. You should also avoid spicy foods.   3. Drinking cold, clear beverages that are either fizzy or sour. Good choices are lemonade and ginger ale.   4. Eating ginger flavored lollipops   5. Smelling fresh lemon, mint, or orange   6. Brushing your teeth right after you eat  7. Over the counter remedies  that are safe to treat nausea in pregnancy:    a.  Vitamin B6 (25mg-50mg every 8 hours)    b.  Doxylamine (Unisom TABLETS or Good Sense Sleep Aid) (1/2-1 tablet every 8 hours)    c.  Diphenhydramine (Benadryl) or meclizine (Dramamine)    d.  Accupressure bands (Sea Bands)

## 2024-06-13 DIAGNOSIS — B18.1 CHRONIC HEPATITIS B VIRUS INFECTION (H): Primary | ICD-10-CM

## 2024-06-14 ENCOUNTER — TELEPHONE (OUTPATIENT)
Dept: FAMILY MEDICINE | Facility: CLINIC | Age: 29
End: 2024-06-14

## 2024-06-14 NOTE — TELEPHONE ENCOUNTER
"Writer attempt #1 to call patient with the help of a \" Vibha\"  regarding clinician's message below. No answer, left non-detailed voicemail, with clinic call back number.     If patient calls back, please relay clinician's message to them. Please help schedule lab-only appointment as indicated, thanks.    CARLA HernandezN, RN   [facility] [place]    "

## 2024-06-14 NOTE — TELEPHONE ENCOUNTER
----- Message from Dawna Dunbar MD sent at 6/13/2024  9:51 AM CDT -----  RN team - please call patient with results. Patient has known hepatitis B. If possible, would like for her to have hep B viral load test done before her appointment with Dr. Salmeron on 7/17. Labs ordered - please assist in scheduling lab only appointment.

## 2024-06-17 NOTE — TELEPHONE ENCOUNTER
----- Message from Dawna Dunbar MD sent at 6/13/2024  9:51 AM CDT -----  RN team - please call patient with results. Patient has known hepatitis B. If possible, would like for her to have hep B viral load test done before her appointment with Dr. Salmeron on 7/17. Labs ordered - please assist in scheduling lab only appointment.      Called patient #2 with assistance of an  to relay provider test result and recommendation above no answer.  Message left on vm request a return call for test result.           Esteban Camacho RN  [facility]

## 2024-06-18 NOTE — TELEPHONE ENCOUNTER
Called pt and relayed message. Pt verbalized understanding. Lab appt scheduled 6/19.      CARLA Rachel CemN RN  [facility]

## 2024-06-19 ENCOUNTER — LAB (OUTPATIENT)
Dept: LAB | Facility: CLINIC | Age: 29
End: 2024-06-19
Payer: MEDICAID

## 2024-06-19 DIAGNOSIS — B18.1 CHRONIC HEPATITIS B VIRUS INFECTION (H): ICD-10-CM

## 2024-06-19 LAB
ALT SERPL W P-5'-P-CCNC: 22 U/L (ref 0–50)
AST SERPL W P-5'-P-CCNC: 19 U/L (ref 0–45)

## 2024-06-19 PROCEDURE — 87350 HEPATITIS BE AG IA: CPT | Mod: 90

## 2024-06-19 PROCEDURE — 36415 COLL VENOUS BLD VENIPUNCTURE: CPT

## 2024-06-19 PROCEDURE — 99000 SPECIMEN HANDLING OFFICE-LAB: CPT

## 2024-06-19 PROCEDURE — 87517 HEPATITIS B DNA QUANT: CPT

## 2024-06-19 PROCEDURE — 84450 TRANSFERASE (AST) (SGOT): CPT

## 2024-06-19 PROCEDURE — 84460 ALANINE AMINO (ALT) (SGPT): CPT

## 2024-06-19 PROCEDURE — 86707 HEPATITIS BE ANTIBODY: CPT | Mod: 90

## 2024-06-20 ENCOUNTER — TELEPHONE (OUTPATIENT)
Dept: FAMILY MEDICINE | Facility: CLINIC | Age: 29
End: 2024-06-20
Payer: MEDICAID

## 2024-06-20 LAB
HBV DNA SERPL NAA+PROBE-ACNC: <10 IU/ML
HBV DNA SERPL NAA+PROBE-LOG IU: <1 {LOG_IU}/ML
HBV E AB SERPL QL IA: POSITIVE
HBV E AG SERPL QL IA: NEGATIVE

## 2024-06-20 NOTE — TELEPHONE ENCOUNTER
----- Message from Dawna Dominguez-Saul sent at 6/20/2024  1:42 PM CDT -----  RN team - please call patient with results. Hepatitis B infection is very low level and liver function is normal. We will recheck these again later in pregnancy.      Called patient with assistance of an  to relay provider test results and recommendation.  Patient verbalized understood.    Esteban Camacho RN  [facility]

## 2024-06-20 NOTE — TELEPHONE ENCOUNTER
----- Message from Dawna Dominguez-Saul sent at 6/20/2024  1:42 PM CDT -----  RN team - please call patient with results. Hepatitis B infection is very low level and liver function is normal. We will recheck these again later in pregnancy.

## 2024-07-02 ENCOUNTER — TELEPHONE (OUTPATIENT)
Dept: FAMILY MEDICINE | Facility: CLINIC | Age: 29
End: 2024-07-02
Payer: COMMERCIAL

## 2024-07-02 NOTE — TELEPHONE ENCOUNTER
"  Forms/Letter Request    Type of form/letter: OTHER:  Hepatitis B Pregnancy Report form       Do we have the form/letter: Yes: \"incoming rightfax\" bin for Dr. CUNHA is pcp Dr. TOMPKINS saw her for her IOB    Who is the form from? MD (if other please explain)    Where did/will the form come from? form was faxed in    When is form/letter needed by: asap    How would you like the form/letter returned: Fax : [phone]      "

## 2024-07-02 NOTE — TELEPHONE ENCOUNTER
Form collected from the .  Pre-filled as much as possible.  Placed form in provider's blue folder for completion.

## 2024-07-17 ENCOUNTER — PRENATAL OFFICE VISIT (OUTPATIENT)
Dept: FAMILY MEDICINE | Facility: CLINIC | Age: 29
End: 2024-07-17
Payer: COMMERCIAL

## 2024-07-17 VITALS
OXYGEN SATURATION: 99 % | RESPIRATION RATE: 16 BRPM | HEART RATE: 78 BPM | TEMPERATURE: 98.5 F | BODY MASS INDEX: 24.5 KG/M2 | WEIGHT: 124.8 LBS | SYSTOLIC BLOOD PRESSURE: 94 MMHG | HEIGHT: 60 IN | DIASTOLIC BLOOD PRESSURE: 65 MMHG

## 2024-07-17 DIAGNOSIS — Z34.82 ENCOUNTER FOR SUPERVISION OF OTHER NORMAL PREGNANCY IN SECOND TRIMESTER: ICD-10-CM

## 2024-07-17 DIAGNOSIS — B18.1 CHRONIC HEPATITIS B VIRUS INFECTION (H): ICD-10-CM

## 2024-07-17 DIAGNOSIS — Z34.90 PREGNANCY, UNSPECIFIED GESTATIONAL AGE: Primary | ICD-10-CM

## 2024-07-17 PROCEDURE — 99207 PR PRENATAL VISIT: CPT | Performed by: FAMILY MEDICINE

## 2024-07-17 PROCEDURE — T1013 SIGN LANG/ORAL INTERPRETER: HCPCS | Mod: U4

## 2024-07-17 RX ORDER — PNV NO.95/FERROUS FUM/FOLIC AC 28MG-0.8MG
1 TABLET ORAL DAILY
Qty: 90 TABLET | Refills: 3 | Status: SHIPPED | OUTPATIENT
Start: 2024-07-17

## 2024-07-17 NOTE — PATIENT INSTRUCTIONS
"Weeks 14 to 18 of Your Pregnancy: Care Instructions  Around this time, you may start to look pregnant. Your baby is now able to pass urine. And the first stool (meconium) is starting to collect in your baby's intestines. Hair is starting to grow on your baby's head.    You may notice some skin changes, such as itchy spots on your palms or acne on your face.    At your next doctor visit, you may have an ultrasound. So you might think about whether you want to know the sex of your baby. Also ask your doctor about flu and COVID-19 shots.     How to reduce stress   Ask for help when you need it.  Try to avoid things that cause you stress.  Seek out things that relieve stress, such as breathing exercises or yoga.     How to get exercise   If you don't usually exercise, start slowly. Short walks may be a good choice.  Try to be active 30 minutes a day, at least 5 days a week.  Avoid activities where you're more likely to fall.  Use light weights to reduce stress on your joints.     How to stay at a healthy weight for you   Talk to your doctor or midwife about how much weight you should gain.  It's generally best to gain:  About 28 to 40 pounds if you're underweight.  About 25 to 35 pounds if you're at a healthy weight.  About 15 to 25 pounds if you're overweight.  About 11 to 20 pounds if you're very overweight (obese).  Follow-up care is a key part of your treatment and safety. Be sure to make and go to all appointments, and call your doctor if you are having problems. It's also a good idea to know your test results and keep a list of the medicines you take.  Where can you learn more?  Go to https://www.[employer].net/patiented  Enter I453 in the search box to learn more about \"Weeks 14 to 18 of Your Pregnancy: Care Instructions.\"  Current as of: July 10, 2023               Content Version: 14.0    2527-7883 [employer].   Care instructions adapted under license by your healthcare professional. If you have " questions about a medical condition or this instruction, always ask your healthcare professional. [employer] disclaims any warranty or liability for your use of this information.

## 2024-07-17 NOTE — PROGRESS NOTES
"SUBJECTIVE:   at 20w0d by unsure LMP. Estimated Date of Delivery: Dec 4, 2024.  She is doing well. She denies nausea and vomiting. She denies abdominal pain/cramping, vaginal bleeding, leakage of fluid. Fetal movement is not yet present.   Concerns: concerned she doesn't feel baby yet. Has not yet been able to schedule ultrasound- did not receive a call.  ROS  Negative except as noted above in HPI.  OBJECTIVE:  Blood pressure 94/65, pulse 78, temperature 98.5  F (36.9  C), temperature source Oral, resp. rate 16, height 1.52 m (4' 11.84\"), weight 56.6 kg (124 lb 12.8 oz), last menstrual period 2024, SpO2 99%, not currently breastfeeding.  Gen: comfortable, no acute distress.  Abd: fundal height at level of umbilicus  See OB Vitals flowsheet.  ASSESSMENT/PLAN:  Paw was seen today for prenatal care.    Diagnoses and all orders for this visit:    Pregnancy, unspecified gestational age  -     US OB > 14 Weeks; Future    Encounter for supervision of other normal pregnancy in second trimester  -     Prenatal Vit-Fe Fumarate-FA (PRENATAL VITAMIN) 27-0.8 MG TABS; Take 1 tablet by mouth daily    Chronic hepatitis b: Has liver enzymes and hep B viral load- low level. Will recheck around 28 weeks. Plan for hep B and HBIG for .    -IUP at 20w0d:   Prenatal labs reviewed.   Fetal survey ordered  GTT next visit.  Peripartum Anesthesia: the patient does not desire an epidural during labor.   Post-partum Contraception: need to discuss at upcoming visit  Breast/Bottle: formula and breast (wants to breastfeed 2-3 months). Has pump at home and does not want another one. I did explain she would qualify since her other child is 5 years old  RTC in 4 weeks or sooner with problems. 1 hour GTT next visit     Visit completed along with assistance of Vibha .    Qiana Salmeron MD    "

## 2024-07-23 ENCOUNTER — HOSPITAL ENCOUNTER (OUTPATIENT)
Dept: ULTRASOUND IMAGING | Facility: HOSPITAL | Age: 29
Discharge: HOME | End: 2024-07-23
Attending: FAMILY MEDICINE | Admitting: FAMILY MEDICINE
Payer: COMMERCIAL

## 2024-07-23 DIAGNOSIS — Z34.90 PREGNANCY, UNSPECIFIED GESTATIONAL AGE: ICD-10-CM

## 2024-07-23 PROCEDURE — 76805 OB US >/= 14 WKS SNGL FETUS: CPT

## 2024-08-14 ENCOUNTER — TELEPHONE (OUTPATIENT)
Dept: FAMILY MEDICINE | Facility: CLINIC | Age: 29
End: 2024-08-14

## 2024-08-14 ENCOUNTER — PRENATAL OFFICE VISIT (OUTPATIENT)
Dept: FAMILY MEDICINE | Facility: CLINIC | Age: 29
End: 2024-08-14
Payer: COMMERCIAL

## 2024-08-14 VITALS
OXYGEN SATURATION: 98 % | RESPIRATION RATE: 16 BRPM | SYSTOLIC BLOOD PRESSURE: 98 MMHG | BODY MASS INDEX: 25.35 KG/M2 | HEIGHT: 60 IN | DIASTOLIC BLOOD PRESSURE: 66 MMHG | HEART RATE: 96 BPM | TEMPERATURE: 98 F | WEIGHT: 129.12 LBS

## 2024-08-14 DIAGNOSIS — Z34.90 PREGNANCY, UNSPECIFIED GESTATIONAL AGE: ICD-10-CM

## 2024-08-14 DIAGNOSIS — B18.1 CHRONIC HEPATITIS B VIRUS INFECTION (H): Primary | ICD-10-CM

## 2024-08-14 DIAGNOSIS — D64.9 ANEMIA, UNSPECIFIED TYPE: ICD-10-CM

## 2024-08-14 LAB
GLUCOSE 1H P 50 G GLC PO SERPL-MCNC: 100 MG/DL (ref 70–129)
HGB BLD-MCNC: 10.7 G/DL (ref 11.7–15.7)

## 2024-08-14 PROCEDURE — 36415 COLL VENOUS BLD VENIPUNCTURE: CPT | Performed by: FAMILY MEDICINE

## 2024-08-14 PROCEDURE — 85018 HEMOGLOBIN: CPT | Performed by: FAMILY MEDICINE

## 2024-08-14 PROCEDURE — 82950 GLUCOSE TEST: CPT | Performed by: FAMILY MEDICINE

## 2024-08-14 PROCEDURE — 86780 TREPONEMA PALLIDUM: CPT | Performed by: FAMILY MEDICINE

## 2024-08-14 PROCEDURE — 99207 PR PRENATAL VISIT: CPT | Performed by: FAMILY MEDICINE

## 2024-08-14 RX ORDER — FERROUS SULFATE 325(65) MG
325 TABLET ORAL
Qty: 90 TABLET | Refills: 2 | Status: SHIPPED | OUTPATIENT
Start: 2024-08-14

## 2024-08-14 NOTE — PROGRESS NOTES
"SUBJECTIVE:  . Estimated Date of Delivery: Dec 4, 2024.  She is doing well. She denies nausea and vomiting. She denies abdominal pain/cramping, vaginal bleeding, leakage of fluid.   Concerns: had ultrasound that showed her dating was much different than her LMP dates- she is not as far along as she thought  ROS  Negative except as noted above in HPI.  OBJECTIVE:  Blood pressure 98/66, pulse 96, temperature 98  F (36.7  C), temperature source Oral, resp. rate 16, height 1.52 m (4' 11.84\"), weight 58.6 kg (129 lb 1.9 oz), last menstrual period 2024, SpO2 98%, not currently breastfeeding.  Gen: comfortable, no acute distress.  See OB Vitals flowsheet.  ASSESSMENT/PLAN:  Sinan was seen today for prenatal care.    Diagnoses and all orders for this visit:    Chronic hepatitis B virus infection (H): Low viral load- repeat at 28 weeks.    Pregnancy, unspecified gestational age  -     Glucose tolerance, gest screen, 1 hour; Future- this was given/drawn before I realized her dating was actually 4 weeks off based on her 18 week US- she was not yet due for 1 hour GCT so will need to repeat this at 24-28 weks  -     Hemoglobin; Future  -     Treponema Abs w Reflex to RPR and Titer; Future  -     Cancel: US OB > 14 Weeks; Future  -     Glucose tolerance, gest screen, 1 hour  -     Hemoglobin  -     Treponema Abs w Reflex to RPR and Titer  -     US OB >14 Weeks Follow Up; Future    Anemia, unspecified type  -     ferrous sulfate (FEROSUL) 325 (65 Fe) MG tablet; Take 1 tablet (325 mg) by mouth daily (with breakfast)      -IUP  Prenatal labs reviewed   RTC in 4 weeks or sooner with problems.    Visit completed along with assistance of Vibha turcioser.    Qiana Salmeron MD    "

## 2024-08-14 NOTE — TELEPHONE ENCOUNTER
----- Message from Qiana Salmeron sent at 8/14/2024  4:37 PM CDT -----  Team - please call patient with results.  Normal 1 hour glucose, but she does have borderline low hemoglobin- continue prenatal vitamin but please also add daily iron tablet. I will send to pharmacy today.

## 2024-08-15 LAB — T PALLIDUM AB SER QL: NONREACTIVE

## 2024-08-16 ENCOUNTER — HOSPITAL ENCOUNTER (OUTPATIENT)
Dept: ULTRASOUND IMAGING | Facility: HOSPITAL | Age: 29
Discharge: HOME | End: 2024-08-16
Attending: FAMILY MEDICINE | Admitting: FAMILY MEDICINE
Payer: COMMERCIAL

## 2024-08-16 DIAGNOSIS — Z34.90 PREGNANCY, UNSPECIFIED GESTATIONAL AGE: ICD-10-CM

## 2024-08-16 PROCEDURE — 76816 OB US FOLLOW-UP PER FETUS: CPT

## 2024-08-19 ENCOUNTER — TELEPHONE (OUTPATIENT)
Dept: FAMILY MEDICINE | Facility: CLINIC | Age: 29
End: 2024-08-19
Payer: COMMERCIAL

## 2024-08-19 NOTE — TELEPHONE ENCOUNTER
----- Message from Qiana Salmeron sent at 8/17/2024  3:30 PM CDT -----  Team - please call patient with results.  Her ultrasound was all normal- they were able to see the areas of the baby that they did not see on the last ultrasound and all were normal

## 2024-09-23 ENCOUNTER — TELEPHONE (OUTPATIENT)
Dept: FAMILY MEDICINE | Facility: CLINIC | Age: 29
End: 2024-09-23
Payer: COMMERCIAL

## 2024-09-23 NOTE — TELEPHONE ENCOUNTER
[facility] Nurse calling to advise that patient has told [facility] that she was not informed of her hepatitis B infection.    Per result/nurse notes of 6/20/24, patient was advised of positive hepatitis B lab and verbalized understanding.     Patient has clinic appointment on 9/25/24 with Dr. Salmeron and PHN requests patient be given additional reinforcement of information regarding infection and necessary precautions. Thank you.    Routed to nurse pool and primary.    Kaylen Contreras, RN, BSN, PHN  [facility]

## 2024-09-27 ENCOUNTER — PRENATAL OFFICE VISIT (OUTPATIENT)
Dept: FAMILY MEDICINE | Facility: CLINIC | Age: 29
End: 2024-09-27
Payer: COMMERCIAL

## 2024-09-27 VITALS
BODY MASS INDEX: 27.48 KG/M2 | DIASTOLIC BLOOD PRESSURE: 66 MMHG | WEIGHT: 140 LBS | RESPIRATION RATE: 12 BRPM | TEMPERATURE: 98.2 F | HEART RATE: 97 BPM | OXYGEN SATURATION: 98 % | SYSTOLIC BLOOD PRESSURE: 100 MMHG | HEIGHT: 60 IN

## 2024-09-27 DIAGNOSIS — O43.199 MARGINAL INSERTION OF UMBILICAL CORD AFFECTING MANAGEMENT OF MOTHER: ICD-10-CM

## 2024-09-27 DIAGNOSIS — D64.9 ANEMIA, UNSPECIFIED TYPE: ICD-10-CM

## 2024-09-27 DIAGNOSIS — Z34.90 PREGNANCY, UNSPECIFIED GESTATIONAL AGE: Primary | ICD-10-CM

## 2024-09-27 DIAGNOSIS — B18.1 CHRONIC HEPATITIS B VIRUS INFECTION (H): ICD-10-CM

## 2024-09-27 LAB
ALBUMIN SERPL BCG-MCNC: 3.7 G/DL (ref 3.5–5.2)
ALP SERPL-CCNC: 60 U/L (ref 40–150)
ALT SERPL W P-5'-P-CCNC: 16 U/L (ref 0–50)
AST SERPL W P-5'-P-CCNC: 18 U/L (ref 0–45)
BILIRUB DIRECT SERPL-MCNC: <0.2 MG/DL (ref 0–0.3)
BILIRUB SERPL-MCNC: 0.2 MG/DL
FERRITIN SERPL-MCNC: 24 NG/ML (ref 6–175)
HGB BLD-MCNC: 11 G/DL (ref 11.7–15.7)
IRON BINDING CAPACITY (ROCHE): 454 UG/DL (ref 240–430)
IRON SATN MFR SERPL: 11 % (ref 15–46)
IRON SERPL-MCNC: 48 UG/DL (ref 37–145)
PROT SERPL-MCNC: 6.9 G/DL (ref 6.4–8.3)

## 2024-09-27 PROCEDURE — 99207 PR PRENATAL VISIT: CPT | Performed by: FAMILY MEDICINE

## 2024-09-27 PROCEDURE — 87517 HEPATITIS B DNA QUANT: CPT | Performed by: FAMILY MEDICINE

## 2024-09-27 PROCEDURE — 82728 ASSAY OF FERRITIN: CPT | Performed by: FAMILY MEDICINE

## 2024-09-27 PROCEDURE — 83540 ASSAY OF IRON: CPT | Performed by: FAMILY MEDICINE

## 2024-09-27 PROCEDURE — 80076 HEPATIC FUNCTION PANEL: CPT | Performed by: FAMILY MEDICINE

## 2024-09-27 PROCEDURE — 83550 IRON BINDING TEST: CPT | Performed by: FAMILY MEDICINE

## 2024-09-27 PROCEDURE — 85018 HEMOGLOBIN: CPT | Performed by: FAMILY MEDICINE

## 2024-09-27 PROCEDURE — 36415 COLL VENOUS BLD VENIPUNCTURE: CPT | Performed by: FAMILY MEDICINE

## 2024-09-27 NOTE — PROGRESS NOTES
"SUBJECTIVE:   at 27w3d. Estimated Date of Delivery: Dec 24, 2024.  She is doing well. She denies vaginal bleeding, leakage of fluid, or urinary symptoms. Fetal movement is present. She is not having contractions.  Concerns: no concerns  ROS  Negative except as noted above in HPI  OBJECTIVE:  Blood pressure 100/66, pulse 97, temperature 98.2  F (36.8  C), temperature source Oral, resp. rate 12, height 1.52 m (4' 11.84\"), weight 63.5 kg (140 lb), last menstrual period 2024, SpO2 98%, not currently breastfeeding.  Gen: Comfortable, no acute distress.  Abd: Gravid, non-tender  See OB Vitals flowsheet.  ASSESSMENT/PLAN:  Sinan was seen today for prenatal care.    Diagnoses and all orders for this visit:    Pregnancy, unspecified gestational age  -     Hemoglobin; Future  -     Iron and iron binding capacity; Future  -     Ferritin; Future  -     US OB >14 Weeks Follow Up; Future  -     Hemoglobin  -     Iron and iron binding capacity  -     Ferritin    Chronic Hepatitis, B Virus: Discussed today- recheck viral load and liver enzymes.   -     Hep B Virus DNA Quant Real Time PCR; Future  -     Hepatic panel (Albumin, ALT, AST, Bili, Alk Phos, TP); Future  -     Hep B Virus DNA Quant Real Time PCR  -     Hepatic panel (Albumin, ALT, AST, Bili, Alk Phos, TP)    Anemia, unspecified type: Start oral iron.    Marginal insertion of umbilical cord affecting management of mother: Check growth ultrasound.   -     US OB >14 Weeks Follow Up; Future      -IUP at 27w3d:   Prenatal labs reviewed   RTC in 4 weeks or sooner with problems. Tdap next visit, bedside US. Declines flu.    Visit completed along with assistance of Vibha .    Qiana Salmeron MD    "

## 2024-09-30 LAB
HBV DNA SERPL NAA+PROBE-ACNC: <10 IU/ML
HBV DNA SERPL NAA+PROBE-LOG IU: <1 {LOG_IU}/ML

## 2024-10-01 ENCOUNTER — HOSPITAL ENCOUNTER (OUTPATIENT)
Dept: ULTRASOUND IMAGING | Facility: HOSPITAL | Age: 29
Discharge: HOME | End: 2024-10-01
Attending: FAMILY MEDICINE | Admitting: FAMILY MEDICINE
Payer: COMMERCIAL

## 2024-10-01 DIAGNOSIS — Z34.90 PREGNANCY, UNSPECIFIED GESTATIONAL AGE: ICD-10-CM

## 2024-10-01 DIAGNOSIS — O43.199 MARGINAL INSERTION OF UMBILICAL CORD AFFECTING MANAGEMENT OF MOTHER: ICD-10-CM

## 2024-10-01 PROCEDURE — 76816 OB US FOLLOW-UP PER FETUS: CPT

## 2024-10-09 ENCOUNTER — PRENATAL OFFICE VISIT (OUTPATIENT)
Dept: FAMILY MEDICINE | Facility: CLINIC | Age: 29
End: 2024-10-09
Payer: COMMERCIAL

## 2024-10-09 VITALS
DIASTOLIC BLOOD PRESSURE: 62 MMHG | HEIGHT: 60 IN | OXYGEN SATURATION: 99 % | WEIGHT: 143 LBS | RESPIRATION RATE: 16 BRPM | SYSTOLIC BLOOD PRESSURE: 101 MMHG | TEMPERATURE: 99.1 F | BODY MASS INDEX: 28.07 KG/M2 | HEART RATE: 79 BPM

## 2024-10-09 DIAGNOSIS — Z34.83 ENCOUNTER FOR SUPERVISION OF OTHER NORMAL PREGNANCY IN THIRD TRIMESTER: ICD-10-CM

## 2024-10-09 PROCEDURE — T1013 SIGN LANG/ORAL INTERPRETER: HCPCS

## 2024-10-09 PROCEDURE — 99207 PR PRENATAL VISIT: CPT | Performed by: FAMILY MEDICINE

## 2024-10-09 PROCEDURE — 90471 IMMUNIZATION ADMIN: CPT | Performed by: FAMILY MEDICINE

## 2024-10-09 PROCEDURE — 90715 TDAP VACCINE 7 YRS/> IM: CPT | Performed by: FAMILY MEDICINE

## 2024-10-09 RX ORDER — PNV NO.95/FERROUS FUM/FOLIC AC 28MG-0.8MG
1 TABLET ORAL DAILY
Qty: 90 TABLET | Refills: 3 | Status: SHIPPED | OUTPATIENT
Start: 2024-10-09

## 2024-10-09 NOTE — PROGRESS NOTES
"SUBJECTIVE:   at 29w1d. Estimated Date of Delivery: Dec 24, 2024.  She is doing well. She denies vaginal bleeding, leakage of fluid, or urinary symptoms. Fetal movement is present. She is not having contractions.  Concerns: no concerns  ROS  Negative except as noted above in HPI  OBJECTIVE:  Blood pressure 101/62, pulse 79, temperature 99.1  F (37.3  C), temperature source Oral, resp. rate 16, height 1.52 m (4' 11.84\"), weight 64.9 kg (143 lb), last menstrual period 2024, SpO2 99%, not currently breastfeeding.  Gen: Comfortable, no acute distress.  Abd: Gravid, non-tender  See OB Vitals flowsheet.  Bedside US: cephalic  ASSESSMENT/PLAN:  Sinan was seen today for prenatal care.    Diagnoses and all orders for this visit:    Encounter for supervision of other normal pregnancy in second trimester  -     Prenatal Vit-Fe Fumarate-FA (PRENATAL VITAMIN) 27-0.8 MG TABS; Take 1 tablet by mouth daily.      -IUP at 29w1d:   Prenatal labs reviewed   Declines flu shot  Reviewed RSV between 32-36 weeks- she is considering this  RTC in 2 weeks or sooner with problems.    Visit completed along with assistance of Vibha .    Qiana Salmeron MD    "

## 2024-10-30 ENCOUNTER — PRENATAL OFFICE VISIT (OUTPATIENT)
Dept: FAMILY MEDICINE | Facility: CLINIC | Age: 29
End: 2024-10-30
Payer: COMMERCIAL

## 2024-10-30 VITALS
RESPIRATION RATE: 16 BRPM | WEIGHT: 144.8 LBS | HEART RATE: 87 BPM | OXYGEN SATURATION: 99 % | DIASTOLIC BLOOD PRESSURE: 62 MMHG | TEMPERATURE: 98.8 F | HEIGHT: 60 IN | BODY MASS INDEX: 28.43 KG/M2 | SYSTOLIC BLOOD PRESSURE: 98 MMHG

## 2024-10-30 DIAGNOSIS — B18.1 CHRONIC HEPATITIS B VIRUS INFECTION (H): ICD-10-CM

## 2024-10-30 DIAGNOSIS — Z34.90 PREGNANCY, UNSPECIFIED GESTATIONAL AGE: Primary | ICD-10-CM

## 2024-10-30 DIAGNOSIS — D64.9 ANEMIA, UNSPECIFIED TYPE: ICD-10-CM

## 2024-10-30 PROCEDURE — 90471 IMMUNIZATION ADMIN: CPT | Performed by: FAMILY MEDICINE

## 2024-10-30 PROCEDURE — 90678 RSV VACC PREF BIVALENT IM: CPT | Performed by: FAMILY MEDICINE

## 2024-10-30 PROCEDURE — 99207 PR PRENATAL VISIT: CPT | Performed by: FAMILY MEDICINE

## 2024-10-30 RX ORDER — FERROUS SULFATE 325(65) MG
325 TABLET ORAL
Qty: 90 TABLET | Refills: 2 | Status: SHIPPED | OUTPATIENT
Start: 2024-10-30

## 2024-10-30 NOTE — PROGRESS NOTES
"SUBJECTIVE:   at 32w1d. Estimated Date of Delivery: Dec 24, 2024.  She is doing well. She denies vaginal bleeding, leakage of fluid, or urinary symptoms. Fetal movement is present. She is not having contractions.  Concerns: no concerns  ROS  Negative except as noted above in HPI  OBJECTIVE:  Blood pressure 98/62, pulse 87, temperature 98.8  F (37.1  C), temperature source Oral, resp. rate 16, height 1.52 m (4' 11.84\"), weight 65.7 kg (144 lb 12.8 oz), last menstrual period 2024, SpO2 99%, not currently breastfeeding.  Gen: Comfortable, no acute distress.  Abd: Gravid, non-tender  See OB Vitals flowsheet.  ASSESSMENT/PLAN:  Sinan was seen today for prenatal care.    Diagnoses and all orders for this visit:    Pregnancy, unspecified gestational age    Anemia, unspecified type  -     ferrous sulfate (FEROSUL) 325 (65 Fe) MG tablet; Take 1 tablet (325 mg) by mouth daily (with breakfast).    Chronic hepatitis B virus infection (H): Low viral levels.    Other orders  -     RSV VACCINE (ABRYSVO)      -IUP at 32w1d:   Prenatal labs reviewed   Declines flu shot  RTC in 2 weeks or sooner with problems.    Visit completed along with assistance of Vibha .    Qiana Salmeron MD    "

## 2024-11-13 ENCOUNTER — PRENATAL OFFICE VISIT (OUTPATIENT)
Dept: FAMILY MEDICINE | Facility: CLINIC | Age: 29
End: 2024-11-13
Payer: COMMERCIAL

## 2024-11-13 VITALS
HEIGHT: 60 IN | TEMPERATURE: 99 F | HEART RATE: 77 BPM | DIASTOLIC BLOOD PRESSURE: 60 MMHG | OXYGEN SATURATION: 98 % | RESPIRATION RATE: 16 BRPM | SYSTOLIC BLOOD PRESSURE: 100 MMHG | BODY MASS INDEX: 28.82 KG/M2 | WEIGHT: 146.8 LBS

## 2024-11-13 DIAGNOSIS — B18.1 CHRONIC HEPATITIS B VIRUS INFECTION (H): ICD-10-CM

## 2024-11-13 DIAGNOSIS — Z34.90 PREGNANCY, UNSPECIFIED GESTATIONAL AGE: Primary | ICD-10-CM

## 2024-11-13 PROCEDURE — 99207 PR PRENATAL VISIT: CPT | Performed by: FAMILY MEDICINE

## 2024-11-13 NOTE — PROGRESS NOTES
"SUBJECTIVE:   at 34w1d. Estimated Date of Delivery: Dec 24, 2024.  She is doing well. She denies vaginal bleeding, leakage of fluid, or urinary symptoms. Fetal movement is present. She is not having contractions.  Concerns: no concerns  ROS  Negative except as noted above in HPI  OBJECTIVE:  Blood pressure 100/60, pulse 77, temperature 99  F (37.2  C), temperature source Oral, resp. rate 16, height 1.52 m (4' 11.84\"), weight 66.6 kg (146 lb 12.8 oz), last menstrual period 2024, SpO2 98%, not currently breastfeeding.  Gen: Comfortable, no acute distress.  Abd: Gravid, non-tender  See OB Vitals flowsheet.  ASSESSMENT/PLAN:  Sinan was seen today for prenatal care.    Diagnoses and all orders for this visit:    Pregnancy, unspecified gestational age    Chronic Hepatitis, B Virus: Low viral load- plan Hep B and HBIG for baby after birth.      -IUP at 34w1d:   Prenatal labs reviewed   RTC in 1 weeks or sooner with problems.    Visit completed along with assistance of Vibha clark.    Qiana Salmeron MD    "

## 2024-11-20 ENCOUNTER — PRENATAL OFFICE VISIT (OUTPATIENT)
Dept: FAMILY MEDICINE | Facility: CLINIC | Age: 29
End: 2024-11-20
Payer: COMMERCIAL

## 2024-11-20 VITALS
WEIGHT: 147.25 LBS | SYSTOLIC BLOOD PRESSURE: 104 MMHG | OXYGEN SATURATION: 98 % | DIASTOLIC BLOOD PRESSURE: 58 MMHG | RESPIRATION RATE: 16 BRPM | TEMPERATURE: 98 F | HEART RATE: 96 BPM | BODY MASS INDEX: 28.91 KG/M2 | HEIGHT: 60 IN

## 2024-11-20 DIAGNOSIS — Z34.90 PREGNANCY, UNSPECIFIED GESTATIONAL AGE: Primary | ICD-10-CM

## 2024-11-20 PROCEDURE — 99207 PR PRENATAL VISIT: CPT | Performed by: FAMILY MEDICINE

## 2024-11-20 NOTE — PROGRESS NOTES
"SUBJECTIVE:   at 35w1d. Estimated Date of Delivery: Dec 24, 2024.  She is doing well. She denies vaginal bleeding, leakage of fluid, or urinary symptoms. Fetal movement is present. She is not having contractions.  Concerns: some right sided pelvic pain, sharp, intermittent.   ROS  Negative except as noted above in HPI  OBJECTIVE:  Blood pressure 104/58, pulse 96, temperature 98  F (36.7  C), temperature source Oral, resp. rate 16, height 1.52 m (4' 11.84\"), weight 66.8 kg (147 lb 4 oz), last menstrual period 2024, SpO2 98%, not currently breastfeeding.  Gen: Comfortable, no acute distress.  Abd: Gravid, non-tender  See OB Vitals flowsheet.  ASSESSMENT/PLAN:  Sinan was seen today for prenatal care.    Diagnoses and all orders for this visit:    Pregnancy, unspecified gestational age      -IUP at 35w1d:   Prenatal labs reviewed   Reviewed warning signs third trimester, how and when to get to hospital  RTC in 1 weeks or sooner with problems. GBS next visit, offer cervical check        Qiana Salmeron MD    "

## 2024-11-27 ENCOUNTER — PRENATAL OFFICE VISIT (OUTPATIENT)
Dept: FAMILY MEDICINE | Facility: CLINIC | Age: 29
End: 2024-11-27
Payer: COMMERCIAL

## 2024-11-27 VITALS
RESPIRATION RATE: 16 BRPM | WEIGHT: 149.25 LBS | DIASTOLIC BLOOD PRESSURE: 67 MMHG | BODY MASS INDEX: 29.3 KG/M2 | OXYGEN SATURATION: 97 % | SYSTOLIC BLOOD PRESSURE: 101 MMHG | TEMPERATURE: 97.9 F | HEART RATE: 84 BPM | HEIGHT: 60 IN

## 2024-11-27 DIAGNOSIS — Z34.90 PREGNANCY, UNSPECIFIED GESTATIONAL AGE: Primary | ICD-10-CM

## 2024-11-27 DIAGNOSIS — B18.1 CHRONIC HEPATITIS B VIRUS INFECTION (H): ICD-10-CM

## 2024-11-27 PROCEDURE — 87653 STREP B DNA AMP PROBE: CPT | Performed by: FAMILY MEDICINE

## 2024-11-27 PROCEDURE — 99207 PR PRENATAL VISIT: CPT | Performed by: FAMILY MEDICINE

## 2024-11-27 NOTE — PROGRESS NOTES
"SUBJECTIVE:   at 36w1d. Estimated Date of Delivery: Dec 24, 2024.  She is doing well. She denies vaginal bleeding, leakage of fluid, or urinary symptoms. Fetal movement is present. She is not having contractions.  Concerns: no concerns  ROS  Negative except as noted above in HPI  OBJECTIVE:  Blood pressure 101/67, pulse 84, temperature 97.9  F (36.6  C), temperature source Oral, resp. rate 16, height 1.52 m (4' 11.84\"), weight 67.7 kg (149 lb 4 oz), last menstrual period 2024, SpO2 97%, not currently breastfeeding.  Gen: Comfortable, no acute distress.  Abd: Gravid, non-tender  See OB Vitals flowsheet.  SVE: 1.5/-2  ASSESSMENT/PLAN:  Sinan was seen today for prenatal care.    Diagnoses and all orders for this visit:    Pregnancy, unspecified gestational age  -     Group B strep PCR    Chronic Hepatitis, B Virus: low viral levels      -IUP at 36w1d:   Prenatal labs reviewed   RTC in 1 weeks or sooner with problems.    Visit completed along with assistance of Vibha .    Qiana Salmeron MD    "

## 2024-11-28 LAB — GP B STREP DNA SPEC QL NAA+PROBE: NEGATIVE

## 2024-12-04 ENCOUNTER — PRENATAL OFFICE VISIT (OUTPATIENT)
Dept: FAMILY MEDICINE | Facility: CLINIC | Age: 29
End: 2024-12-04
Payer: COMMERCIAL

## 2024-12-04 VITALS
HEIGHT: 60 IN | BODY MASS INDEX: 29.86 KG/M2 | WEIGHT: 152.12 LBS | RESPIRATION RATE: 16 BRPM | OXYGEN SATURATION: 97 % | HEART RATE: 94 BPM | TEMPERATURE: 99.3 F | DIASTOLIC BLOOD PRESSURE: 70 MMHG | SYSTOLIC BLOOD PRESSURE: 105 MMHG

## 2024-12-04 DIAGNOSIS — Z34.90 PREGNANCY, UNSPECIFIED GESTATIONAL AGE: Primary | ICD-10-CM

## 2024-12-04 DIAGNOSIS — B18.1 CHRONIC HEPATITIS B VIRUS INFECTION (H): ICD-10-CM

## 2024-12-04 PROCEDURE — 99207 PR PRENATAL VISIT: CPT | Performed by: FAMILY MEDICINE

## 2024-12-04 NOTE — PROGRESS NOTES
"SUBJECTIVE:   at 37w1d. Estimated Date of Delivery: Dec 24, 2024.  She is doing well. She denies vaginal bleeding, leakage of fluid, or urinary symptoms. Fetal movement is present. She is not having contractions.  Concerns: no concerns  ROS  Negative except as noted above in HPI  OBJECTIVE:  Blood pressure 105/70, pulse 94, temperature 99.3  F (37.4  C), temperature source Oral, resp. rate 16, height 1.52 m (4' 11.84\"), weight 69 kg (152 lb 1.9 oz), last menstrual period 2024, SpO2 97%, not currently breastfeeding.  Gen: Comfortable, no acute distress.  Abd: Gravid, non-tender  See OB Vitals flowsheet.  ASSESSMENT/PLAN:  Sinan was seen today for prenatal care.    Diagnoses and all orders for this visit:    Pregnancy, unspecified gestational age    Chronic Hepatitis, B Virus: Low viral levels.       -IUP at 37w1d:   Prenatal labs reviewed   Discussed when to go to hospital- gave # to call  RTC in 1 weeks or sooner with problems.    Visit completed along with assistance of Vibha .    Qiana Salmeron MD    "

## 2024-12-11 ENCOUNTER — PRENATAL OFFICE VISIT (OUTPATIENT)
Dept: FAMILY MEDICINE | Facility: CLINIC | Age: 29
End: 2024-12-11
Payer: COMMERCIAL

## 2024-12-11 ENCOUNTER — VIRTUAL VISIT (OUTPATIENT)
Dept: INTERPRETER SERVICES | Facility: CLINIC | Age: 29
End: 2024-12-11

## 2024-12-11 VITALS
HEIGHT: 60 IN | TEMPERATURE: 98.1 F | RESPIRATION RATE: 16 BRPM | BODY MASS INDEX: 29.8 KG/M2 | SYSTOLIC BLOOD PRESSURE: 96 MMHG | WEIGHT: 151.8 LBS | HEART RATE: 95 BPM | OXYGEN SATURATION: 99 % | DIASTOLIC BLOOD PRESSURE: 65 MMHG

## 2024-12-11 DIAGNOSIS — Z34.90 PREGNANCY, UNSPECIFIED GESTATIONAL AGE: Primary | ICD-10-CM

## 2024-12-11 PROCEDURE — 99207 PR PRENATAL VISIT: CPT | Performed by: FAMILY MEDICINE

## 2024-12-11 NOTE — PROGRESS NOTES
"SUBJECTIVE:   at 38w1d. Estimated Date of Delivery: Dec 24, 2024.  She is doing well. She denies vaginal bleeding, leakage of fluid, or urinary symptoms. Fetal movement is present. She is not having contractions.  Concerns: none  ROS  Negative except as noted above in HPI  OBJECTIVE:  Blood pressure 96/65, pulse 95, temperature 98.1  F (36.7  C), temperature source Oral, resp. rate 16, height 1.52 m (4' 11.84\"), weight 68.9 kg (151 lb 12.8 oz), last menstrual period 2024, SpO2 99%, not currently breastfeeding.  Gen: Comfortable, no acute distress.  Abd: Gravid, non-tender  See OB Vitals flowsheet.  ASSESSMENT/PLAN:  Sinan was seen today for prenatal care.    Diagnoses and all orders for this visit:    Pregnancy, unspecified gestational age      -IUP at 38w1d:   Prenatal labs reviewed   Reviewed on-call coverage, as I will be out of town week of   RTC in 1 weeks or sooner with problems.    Visit completed along with assistance of Vibha .    Qiana Salmeron MD    "

## 2024-12-18 ENCOUNTER — PRENATAL OFFICE VISIT (OUTPATIENT)
Dept: FAMILY MEDICINE | Facility: CLINIC | Age: 29
End: 2024-12-18
Payer: COMMERCIAL

## 2024-12-18 VITALS
HEART RATE: 98 BPM | TEMPERATURE: 97.9 F | WEIGHT: 150.12 LBS | HEIGHT: 60 IN | DIASTOLIC BLOOD PRESSURE: 65 MMHG | OXYGEN SATURATION: 98 % | BODY MASS INDEX: 29.47 KG/M2 | RESPIRATION RATE: 16 BRPM | SYSTOLIC BLOOD PRESSURE: 97 MMHG

## 2024-12-18 DIAGNOSIS — Z34.90 PREGNANCY, UNSPECIFIED GESTATIONAL AGE: Primary | ICD-10-CM

## 2024-12-18 PROCEDURE — T1013 SIGN LANG/ORAL INTERPRETER: HCPCS | Mod: U4

## 2024-12-18 PROCEDURE — 99207 PR PRENATAL VISIT: CPT | Performed by: FAMILY MEDICINE

## 2024-12-18 NOTE — PROGRESS NOTES
"SUBJECTIVE:   at 39w1d. Estimated Date of Delivery: Dec 24, 2024.  She is doing well. She denies vaginal bleeding, leakage of fluid, or urinary symptoms. Fetal movement is present. She is having contractions.  Concerns: having contractions the last 3 days, did not sleep much last night. Would like her cervix checked.  ROS  Negative except as noted above in HPI  OBJECTIVE:  Blood pressure 97/65, pulse 98, temperature 97.9  F (36.6  C), temperature source Oral, resp. rate 16, height 1.52 m (4' 11.84\"), weight 68.1 kg (150 lb 1.9 oz), last menstrual period 2024, SpO2 98%, not currently breastfeeding.  Gen: Comfortable, no acute distress.  Abd: Gravid, non-tender  See OB Vitals flowsheet.  SVE: 3/70/3  ASSESSMENT/PLAN:  Sinan was seen today for prenatal care.    Diagnoses and all orders for this visit:    Pregnancy, unspecified gestational age      -IUP at 39w1d:   Prenatal labs reviewed   Reviewed how and when to get to hospital- she may be in early labor  RTC in 1 weeks or sooner with problems- discussed on-call coverage as I am out of clinic next week        Qiana Salmeron MD    "

## 2024-12-23 ENCOUNTER — PRENATAL OFFICE VISIT (OUTPATIENT)
Dept: FAMILY MEDICINE | Facility: CLINIC | Age: 29
End: 2024-12-23
Payer: COMMERCIAL

## 2024-12-23 ENCOUNTER — VIRTUAL VISIT (OUTPATIENT)
Dept: INTERPRETER SERVICES | Facility: CLINIC | Age: 29
End: 2024-12-23

## 2024-12-23 VITALS
HEART RATE: 97 BPM | BODY MASS INDEX: 30.29 KG/M2 | HEIGHT: 60 IN | TEMPERATURE: 98.3 F | RESPIRATION RATE: 16 BRPM | OXYGEN SATURATION: 98 % | DIASTOLIC BLOOD PRESSURE: 67 MMHG | WEIGHT: 154.3 LBS | SYSTOLIC BLOOD PRESSURE: 100 MMHG

## 2024-12-23 DIAGNOSIS — O36.8130 DECREASED FETAL MOVEMENTS IN THIRD TRIMESTER, SINGLE OR UNSPECIFIED FETUS: ICD-10-CM

## 2024-12-23 DIAGNOSIS — Z34.83 ENCOUNTER FOR SUPERVISION OF OTHER NORMAL PREGNANCY IN THIRD TRIMESTER: Primary | ICD-10-CM

## 2024-12-23 PROCEDURE — T1013 SIGN LANG/ORAL INTERPRETER: HCPCS | Mod: U4

## 2024-12-23 NOTE — PROGRESS NOTES
"SUBJECTIVE:  Sinan [facility]  at 39w6d. Estimated Date of Delivery: Dec 24, 2024.  She is doing well. She denies abdominal pain/cramping, vaginal bleeding, leakage of fluid. Fetal movement is a little less than before.   Concerns: none - just baby moving less; having lots of contractions but not severe like labor    ROS:  Negative except as noted above in HPI.    OBJECTIVE:  Vitals: /67 (BP Location: Right arm, Patient Position: Right side, Cuff Size: Adult Regular)   Pulse 97   Temp 98.3  F (36.8  C) (Oral)   Resp 16   Ht 1.52 m (4' 11.84\")   Wt 70 kg (154 lb 4.8 oz)   LMP 2024 (Exact Date)   SpO2 98%   BMI 30.30 kg/m     Total weight gain:  11 kg (24 lb 4.8 oz)   Wt Readings from Last 3 Encounters:   24 70 kg (154 lb 4.8 oz)   24 68.1 kg (150 lb 1.9 oz)   24 68.9 kg (151 lb 12.8 oz)   Exam: Per flowsheet  Non-stress Test:  NST interpretation: reactive. Test duration 20 min. Baseline FHR initially 150 then 140 bpm. Baseline variability: moderate. Accelerations: present. Decelerations: absent. Uterine activity: absent.      ASSESSMENT/PLAN:  Sinan was seen today for prenatal care.    Diagnoses and all orders for this visit:    Encounter for supervision of other normal pregnancy in third trimester      29 year old  at 39w6d   GBS negative 24  Declines SVE  NST done today for report of decreased fetal movement - reactive. Discussed fetal kick counts, when to call or present to L&D  Pain management during labor: plans unmedicated; did not use anything with two prior deliveries (ages 4 and 7)  Post-partum Contraception: thinking nexplanon   Breast/Bottle: Breast and formula   Reviewed plans for getting to the hospital.  RTC as scheduled or sooner with problems.    A Vibha  was used for this visit.   "

## 2024-12-25 ENCOUNTER — HOSPITAL ENCOUNTER (INPATIENT)
Facility: HOSPITAL | Age: 29
End: 2024-12-25
Attending: FAMILY MEDICINE
Payer: COMMERCIAL

## 2024-12-25 DIAGNOSIS — Z3A.40 40 WEEKS GESTATION OF PREGNANCY: ICD-10-CM

## 2024-12-25 PROBLEM — Z34.90 CURRENTLY PREGNANT: Status: ACTIVE | Noted: 2024-12-25

## 2024-12-25 LAB
ABO + RH BLD: NORMAL
BLD GP AB SCN SERPL QL: NEGATIVE
HGB BLD-MCNC: 11.7 G/DL (ref 11.7–15.7)
SPECIMEN EXP DATE BLD: NORMAL

## 2024-12-25 PROCEDURE — 86901 BLOOD TYPING SEROLOGIC RH(D): CPT

## 2024-12-25 PROCEDURE — 86780 TREPONEMA PALLIDUM: CPT

## 2024-12-25 PROCEDURE — 722N000001 HC LABOR CARE VAGINAL DELIVERY SINGLE

## 2024-12-25 PROCEDURE — 250N000011 HC RX IP 250 OP 636

## 2024-12-25 PROCEDURE — 120N000001 HC R&B MED SURG/OB

## 2024-12-25 PROCEDURE — 85018 HEMOGLOBIN: CPT

## 2024-12-25 PROCEDURE — 250N000009 HC RX 250

## 2024-12-25 PROCEDURE — 250N000013 HC RX MED GY IP 250 OP 250 PS 637

## 2024-12-25 PROCEDURE — 36415 COLL VENOUS BLD VENIPUNCTURE: CPT

## 2024-12-25 PROCEDURE — 10907ZC DRAINAGE OF AMNIOTIC FLUID, THERAPEUTIC FROM PRODUCTS OF CONCEPTION, VIA NATURAL OR ARTIFICIAL OPENING: ICD-10-PCS

## 2024-12-25 RX ORDER — TRANEXAMIC ACID 10 MG/ML
1 INJECTION, SOLUTION INTRAVENOUS EVERY 30 MIN PRN
Status: ACTIVE | OUTPATIENT
Start: 2024-12-25

## 2024-12-25 RX ORDER — IBUPROFEN 800 MG/1
800 TABLET, FILM COATED ORAL EVERY 6 HOURS PRN
Status: DISPENSED | OUTPATIENT
Start: 2024-12-25

## 2024-12-25 RX ORDER — ONDANSETRON 2 MG/ML
4 INJECTION INTRAMUSCULAR; INTRAVENOUS EVERY 6 HOURS PRN
Status: DISCONTINUED | OUTPATIENT
Start: 2024-12-25 | End: 2024-12-25 | Stop reason: HOSPADM

## 2024-12-25 RX ORDER — OXYTOCIN 10 [USP'U]/ML
10 INJECTION, SOLUTION INTRAMUSCULAR; INTRAVENOUS
Status: ACTIVE | OUTPATIENT
Start: 2024-12-25

## 2024-12-25 RX ORDER — BISACODYL 10 MG
10 SUPPOSITORY, RECTAL RECTAL DAILY PRN
Status: ACTIVE | OUTPATIENT
Start: 2024-12-25

## 2024-12-25 RX ORDER — OXYTOCIN/0.9 % SODIUM CHLORIDE 30/500 ML
340 PLASTIC BAG, INJECTION (ML) INTRAVENOUS CONTINUOUS PRN
Status: DISCONTINUED | OUTPATIENT
Start: 2024-12-25 | End: 2024-12-25 | Stop reason: HOSPADM

## 2024-12-25 RX ORDER — NALOXONE HYDROCHLORIDE 0.4 MG/ML
0.4 INJECTION, SOLUTION INTRAMUSCULAR; INTRAVENOUS; SUBCUTANEOUS
Status: DISCONTINUED | OUTPATIENT
Start: 2024-12-25 | End: 2024-12-25 | Stop reason: HOSPADM

## 2024-12-25 RX ORDER — ACETAMINOPHEN 325 MG/1
650 TABLET ORAL EVERY 4 HOURS PRN
Status: DISPENSED | OUTPATIENT
Start: 2024-12-25

## 2024-12-25 RX ORDER — METHYLERGONOVINE MALEATE 0.2 MG/ML
200 INJECTION INTRAVENOUS
Status: DISCONTINUED | OUTPATIENT
Start: 2024-12-25 | End: 2024-12-25 | Stop reason: HOSPADM

## 2024-12-25 RX ORDER — OXYTOCIN 10 [USP'U]/ML
10 INJECTION, SOLUTION INTRAMUSCULAR; INTRAVENOUS
Status: DISCONTINUED | OUTPATIENT
Start: 2024-12-25 | End: 2024-12-25 | Stop reason: HOSPADM

## 2024-12-25 RX ORDER — CITRIC ACID/SODIUM CITRATE 334-500MG
30 SOLUTION, ORAL ORAL
Status: DISCONTINUED | OUTPATIENT
Start: 2024-12-25 | End: 2024-12-25 | Stop reason: HOSPADM

## 2024-12-25 RX ORDER — LOPERAMIDE HYDROCHLORIDE 2 MG/1
2 CAPSULE ORAL
Status: ACTIVE | OUTPATIENT
Start: 2024-12-25

## 2024-12-25 RX ORDER — MISOPROSTOL 200 UG/1
800 TABLET ORAL
Status: ACTIVE | OUTPATIENT
Start: 2024-12-25

## 2024-12-25 RX ORDER — ONDANSETRON 4 MG/1
4 TABLET, ORALLY DISINTEGRATING ORAL EVERY 6 HOURS PRN
Status: DISCONTINUED | OUTPATIENT
Start: 2024-12-25 | End: 2024-12-25 | Stop reason: HOSPADM

## 2024-12-25 RX ORDER — METOCLOPRAMIDE HYDROCHLORIDE 5 MG/ML
10 INJECTION INTRAMUSCULAR; INTRAVENOUS EVERY 6 HOURS PRN
Status: DISCONTINUED | OUTPATIENT
Start: 2024-12-25 | End: 2024-12-25 | Stop reason: HOSPADM

## 2024-12-25 RX ORDER — TRANEXAMIC ACID 10 MG/ML
1 INJECTION, SOLUTION INTRAVENOUS EVERY 30 MIN PRN
Status: DISCONTINUED | OUTPATIENT
Start: 2024-12-25 | End: 2024-12-25 | Stop reason: HOSPADM

## 2024-12-25 RX ORDER — LOPERAMIDE HYDROCHLORIDE 2 MG/1
4 CAPSULE ORAL
Status: ACTIVE | OUTPATIENT
Start: 2024-12-25

## 2024-12-25 RX ORDER — PROCHLORPERAZINE MALEATE 10 MG
10 TABLET ORAL EVERY 6 HOURS PRN
Status: DISCONTINUED | OUTPATIENT
Start: 2024-12-25 | End: 2024-12-25 | Stop reason: HOSPADM

## 2024-12-25 RX ORDER — MISOPROSTOL 200 UG/1
400 TABLET ORAL
Status: DISCONTINUED | OUTPATIENT
Start: 2024-12-25 | End: 2024-12-25 | Stop reason: HOSPADM

## 2024-12-25 RX ORDER — NALOXONE HYDROCHLORIDE 0.4 MG/ML
0.2 INJECTION, SOLUTION INTRAMUSCULAR; INTRAVENOUS; SUBCUTANEOUS
Status: DISCONTINUED | OUTPATIENT
Start: 2024-12-25 | End: 2024-12-25 | Stop reason: HOSPADM

## 2024-12-25 RX ORDER — MISOPROSTOL 200 UG/1
800 TABLET ORAL
Status: DISCONTINUED | OUTPATIENT
Start: 2024-12-25 | End: 2024-12-25 | Stop reason: HOSPADM

## 2024-12-25 RX ORDER — ACETAMINOPHEN 325 MG/1
650 TABLET ORAL EVERY 4 HOURS PRN
Status: DISCONTINUED | OUTPATIENT
Start: 2024-12-25 | End: 2024-12-25 | Stop reason: HOSPADM

## 2024-12-25 RX ORDER — KETOROLAC TROMETHAMINE 30 MG/ML
30 INJECTION, SOLUTION INTRAMUSCULAR; INTRAVENOUS
Status: COMPLETED | OUTPATIENT
Start: 2024-12-25 | End: 2024-12-25

## 2024-12-25 RX ORDER — METOCLOPRAMIDE 10 MG/1
10 TABLET ORAL EVERY 6 HOURS PRN
Status: DISCONTINUED | OUTPATIENT
Start: 2024-12-25 | End: 2024-12-25 | Stop reason: HOSPADM

## 2024-12-25 RX ORDER — OXYTOCIN/0.9 % SODIUM CHLORIDE 30/500 ML
100-340 PLASTIC BAG, INJECTION (ML) INTRAVENOUS CONTINUOUS PRN
Status: ACTIVE | OUTPATIENT
Start: 2024-12-25

## 2024-12-25 RX ORDER — MODIFIED LANOLIN
OINTMENT (GRAM) TOPICAL
Status: ACTIVE | OUTPATIENT
Start: 2024-12-25

## 2024-12-25 RX ORDER — FENTANYL CITRATE 50 UG/ML
50 INJECTION, SOLUTION INTRAMUSCULAR; INTRAVENOUS EVERY 30 MIN PRN
Status: DISCONTINUED | OUTPATIENT
Start: 2024-12-25 | End: 2024-12-25 | Stop reason: HOSPADM

## 2024-12-25 RX ORDER — SODIUM CHLORIDE, SODIUM LACTATE, POTASSIUM CHLORIDE, CALCIUM CHLORIDE 600; 310; 30; 20 MG/100ML; MG/100ML; MG/100ML; MG/100ML
INJECTION, SOLUTION INTRAVENOUS CONTINUOUS
Status: DISCONTINUED | OUTPATIENT
Start: 2024-12-25 | End: 2024-12-25 | Stop reason: HOSPADM

## 2024-12-25 RX ORDER — HYDROCORTISONE 25 MG/G
CREAM TOPICAL 3 TIMES DAILY PRN
Status: ACTIVE | OUTPATIENT
Start: 2024-12-25

## 2024-12-25 RX ORDER — LOPERAMIDE HYDROCHLORIDE 2 MG/1
4 CAPSULE ORAL
Status: DISCONTINUED | OUTPATIENT
Start: 2024-12-25 | End: 2024-12-25 | Stop reason: HOSPADM

## 2024-12-25 RX ORDER — DOCUSATE SODIUM 100 MG/1
100 CAPSULE, LIQUID FILLED ORAL DAILY
Status: DISPENSED | OUTPATIENT
Start: 2024-12-25

## 2024-12-25 RX ORDER — MISOPROSTOL 200 UG/1
400 TABLET ORAL
Status: ACTIVE | OUTPATIENT
Start: 2024-12-25

## 2024-12-25 RX ORDER — CARBOPROST TROMETHAMINE 250 UG/ML
250 INJECTION, SOLUTION INTRAMUSCULAR
Status: ACTIVE | OUTPATIENT
Start: 2024-12-25

## 2024-12-25 RX ORDER — OXYTOCIN/0.9 % SODIUM CHLORIDE 30/500 ML
340 PLASTIC BAG, INJECTION (ML) INTRAVENOUS CONTINUOUS PRN
Status: ACTIVE | OUTPATIENT
Start: 2024-12-25

## 2024-12-25 RX ORDER — LIDOCAINE 40 MG/G
CREAM TOPICAL
Status: DISCONTINUED | OUTPATIENT
Start: 2024-12-25 | End: 2024-12-25 | Stop reason: HOSPADM

## 2024-12-25 RX ORDER — CARBOPROST TROMETHAMINE 250 UG/ML
250 INJECTION, SOLUTION INTRAMUSCULAR
Status: DISCONTINUED | OUTPATIENT
Start: 2024-12-25 | End: 2024-12-25 | Stop reason: HOSPADM

## 2024-12-25 RX ORDER — IBUPROFEN 800 MG/1
800 TABLET, FILM COATED ORAL
Status: COMPLETED | OUTPATIENT
Start: 2024-12-25 | End: 2024-12-25

## 2024-12-25 RX ORDER — METHYLERGONOVINE MALEATE 0.2 MG/ML
200 INJECTION INTRAVENOUS
Status: ACTIVE | OUTPATIENT
Start: 2024-12-25

## 2024-12-25 RX ORDER — LOPERAMIDE HYDROCHLORIDE 2 MG/1
2 CAPSULE ORAL
Status: DISCONTINUED | OUTPATIENT
Start: 2024-12-25 | End: 2024-12-25 | Stop reason: HOSPADM

## 2024-12-25 RX ADMIN — DOCUSATE SODIUM 100 MG: 100 CAPSULE, LIQUID FILLED ORAL at 18:17

## 2024-12-25 RX ADMIN — Medication 340 ML/HR: at 17:33

## 2024-12-25 RX ADMIN — KETOROLAC TROMETHAMINE 30 MG: 30 INJECTION, SOLUTION INTRAMUSCULAR at 18:18

## 2024-12-25 RX ADMIN — METHYLERGONOVINE MALEATE 200 MCG: 0.2 INJECTION INTRAVENOUS at 17:42

## 2024-12-25 ASSESSMENT — ACTIVITIES OF DAILY LIVING (ADL)
ADLS_ACUITY_SCORE: 15
ADLS_ACUITY_SCORE: 28
ADLS_ACUITY_SCORE: 15

## 2024-12-25 NOTE — PROGRESS NOTES
1613 Dr. Rinaldi at bedside.  SVE per RN 7/90%/0.  Plan for pph prevention is IV pitocin and IM methergine after delivery.  Per pt statement she had a postpartum hemhorrhage with one of her prior deliveries that did not require a blood transfusion.  Per Dr. Rinaldi - no TXA prior to delivery.  Postpartum hemhorrhage medications at bedside and two Ivs in place.

## 2024-12-25 NOTE — H&P
"Maternal Admission H&P  [facility]  Date of Admission: 2024  Date of Service: 2024    Name      Sinan Dubon         1995  MRN       [id number]  PCP        Dr. Qiana Salmeron at [facility].    ________________________________________________________________________    Assessment and Plan:  29 year old  at 40w1d presenting with contractions and some vaginal spotting, 5 cm on arrival and willam every 3-5 minutes. Has had some latent labor for a week or so now, requesting to be admitted. AROM performed after discussing risks and benefits - small amount of clear fluid.   Baby AGA. Anticipate .  Group B strep: negative  Chronic hepatitis B with low viral load - baby will need HBIg and vaccine  Marginal cord insertion - growth US 53%-ile at 28 weeks  Planning for unmedicated delivery  Plan is to breast and formula feed  Wants nexplanon for contraeption  It's a boy! \"Colt\"    Stephany Rinaldi MD  [facility]    ________________________________________________________________________    Chief Complaint: active labor.    HPI: Sinan Dubon is a 29 year old woman at 40w1d, based on an Estimated Date of Delivery: Dec 24, 2024. She presents with some vaginal spotting when wiping as well as contractions that have increased in frequency and severity. She has followed with Dr. Salmeron during this pregnancy.     Patient Active Problem List    Diagnosis Date Noted    Currently pregnant 2024     Priority: Medium    Chronic Hepatitis, B Virus      Priority: Medium     Carrier. Low viral levels during pregnancy. Normal LFTs.        Strabismus      Priority: Medium     Created by Conversion  Replacement Utility updated for latest IMO load        Monocular Esotropia Of The Right Eye      Priority: Medium     Created by Conversion          OB History    Para Term  AB Living   3 2 2 0 0 2   SAB IAB Ectopic Multiple Live " Births   0 0 0 0 2      # Outcome Date GA Lbr Khris/2nd Weight Sex Type Anes PTL Lv   3 Current            2 Term 11/17/18 40w5d 03:45 / 00:14 3.742 kg (8 lb 4 oz) M Vag-Spont Local N LANCE      Name: SHANTELMALE-PAW      Apgar1: 8  Apgar5: 9   1 Term 05/21/15 40w4d 07:46 / 00:22 3.459 kg (7 lb 10 oz) F Vag-Spont Local N LANCE      Name: SHANTEL,FEMALE-PAW      Apgar1: 8  Apgar5: 9     Review of Systems Negative except what is noted in HPI.     Past Medical History:   Diagnosis Date    Adjustment disorder with depressed mood     In remission. Denies concerns during pregnancy. Continue to monitor.         Anemia 03/09/2015    Viral hepatitis B chronic (H)       Past Surgical History:   Procedure Laterality Date    OTHER SURGICAL HISTORY      negative   Patient has no known allergies.  Family History   Problem Relation Age of Onset    No Known Problems Mother     No Known Problems Father      Social History     Socioeconomic History    Marital status: Single     Spouse name: Not on file    Number of children: Not on file    Years of education: Not on file    Highest education level: Not on file   Occupational History    Not on file   Tobacco Use    Smoking status: Never     Passive exposure: Never    Smokeless tobacco: Never   Vaping Use    Vaping status: Never Used   Substance and Sexual Activity    Alcohol use: No    Drug use: No    Sexual activity: Yes     Partners: Male     Birth control/protection: None   Other Topics Concern    Not on file   Social History Narrative    FOB Yemi, cultrually . Lives with FOB family.  Feels safe and supported. Receives therapy from [facility]     Social Drivers of Health     Financial Resource Strain: Not on file   Food Insecurity: Not on file   Transportation Needs: Not on file   Physical Activity: Not on file   Stress: Not on file   Social Connections: Not on file   Interpersonal Safety: Low Risk  (7/17/2024)    Interpersonal Safety     Do you feel physically and  emotionally safe where you currently live?: Yes     Within the past 12 months, have you been hit, slapped, kicked or otherwise physically hurt by someone?: No     Within the past 12 months, have you been humiliated or emotionally abused in other ways by your partner or ex-partner?: No   Housing Stability: Not on file     Prior to Admission Medication List  Medications Prior to Admission   Medication Sig Dispense Refill Last Dose/Taking    ferrous sulfate (FEROSUL) 325 (65 Fe) MG tablet Take 1 tablet (325 mg) by mouth daily (with breakfast). 90 tablet 2 12/24/2024    Prenatal Vit-Fe Fumarate-FA (PRENATAL VITAMIN) 27-0.8 MG TABS Take 1 tablet by mouth daily. 90 tablet 3 12/24/2024      Allergies  No Known Allergies  Flu Covid Tdap RSV   08/30/2018 05/01/2021 10/09/2024 10/30/2024     Physical Exam  Patient Vitals for the past 24 hrs:   BP Temp Temp src Resp SpO2 Height Weight   12/25/24 1301 -- -- -- -- -- 1.524 m (5') 72.6 kg (160 lb 1.6 oz)   12/25/24 1250 114/76 98.1  F (36.7  C) Oral 16 98 % -- --     Wt Readings from Last 1 Encounters:   12/25/24 72.6 kg (160 lb 1.6 oz)   Prepregnancy: 59 kg (130 lb), BMI 25.39. Total gain: 13.7 kg (30 lb 1.6 oz) (expected gain: 7 kg (15 lb)-11.5 kg (25 lb)).    HEART: RRR, no murmur  LUNGS: CTA bilaterally  Fetal Heart Tones: Baseline 145 bpm, variability moderate (6-25 bpm), no decelerations. Reactive.  CONTRACTIONS:  regular, every 3-4 minutes.  CERVIX: dilation 5 cm , 70% effaced, soft, and -1 station.  FLUID: Clear.  Fetal Presentation: vertex.    Labs  No results found for this or any previous visit (from the past 24 hours).   Group B Strep PCR   Date Value Ref Range Status   11/27/2024 Negative Negative Final     Comment:     Presumed negative for Streptococcus agalactiae (Group B Streptococcus) or the number of organisms may be below the limit of detection of the assay.      Antibody Screen   Date Value Ref Range Status   05/31/2024 Negative Negative Final     Hepatitis B  Surface Antigen   Date Value Ref Range Status   05/31/2024 Reactive (A) Nonreactive Final     Comment:     Confirmed Reactive     Neisseria gonorrhoeae   Date Value Ref Range Status   05/09/2018 Negative Negative Final     Hemoglobin   Date Value Ref Range Status   09/27/2024 11.0 (L) 11.7 - 15.7 g/dL Final        Completed by:   Stephany Rinaldi MD  [facility]  12/25/2024 2:01 PM   used: Augustus. - in person

## 2024-12-25 NOTE — PROGRESS NOTES
1518 RN spoke with Dr. Rinaldi - relayed to MD that FHR tracing has baseline of 140bpm, moderate variability, acceleration present (one 15x15 with connected FHR tracing), no decelerations.  Per MD - RN may do intermittent auscultation at this time.

## 2024-12-25 NOTE — PROGRESS NOTES
1732 Delivery of male infant vaginally.  Infant brought to mother's abdomen, dried and stimulated.  Cord clamping delayed greater than a minute.  Cord cut and infant brought skin to skin with mother.  APGARs 8/9. .

## 2024-12-25 NOTE — PROGRESS NOTES
Data: Patient presented to [facility]: 2024 12:29 PM.  Reason for maternal/fetal assessment is a small quarter sized amount of vaginal bleeding when wiping. Patient reports contractions every 30-40 minutes.  When placed on EFM, patient having contractions every 2-5 minutes that patient rates 4-5/10 on the pain scale that she mostly feels vaginal pain and abdominal tightening. Patient denies leaking of vaginal fluid/rupture of membranes, abdominal pain, nausea, vomiting, headache, visual disturbances, epigastric or RUQ pain, significant edema.  SVE was 5/70%/-1/soft/mid, changed from 2cm SVE sometime last week.  Patient requesting to stay today for labor if possible. Patient reports fetal movement is normal. Patient is a 40w1d .  Prenatal record reviewed. Pregnancy has been complicated by marginal cord insertion and a hepatitis B chronic infection with a  low viral load .    Vital signs wnl. Support person is present.     Action: Verbal consent for EFM. Triage assessment completed.     Response: Patient verbalized agreement with plan. Will contact Dr. Rinaldi with update and further orders.    1320 RN called Dr. Rinaldi and relayed above information.  RN relayed to MD that patient did not yet have a reactive NST, but RN had repositioned patient and gave juice.  MD stated that plan was to admit patient and have her continue to labor on her own.  If NST is reactive, RN may do intermittent auscultation for fetal monitoring.  RN took telephone orders for labor orders.

## 2024-12-26 ENCOUNTER — VIRTUAL VISIT (OUTPATIENT)
Dept: INTERPRETER SERVICES | Facility: CLINIC | Age: 29
End: 2024-12-26
Payer: COMMERCIAL

## 2024-12-26 ENCOUNTER — VIRTUAL VISIT (OUTPATIENT)
Dept: INTERPRETER SERVICES | Facility: CLINIC | Age: 29
End: 2024-12-26

## 2024-12-26 VITALS
HEIGHT: 60 IN | BODY MASS INDEX: 28.25 KG/M2 | HEART RATE: 72 BPM | DIASTOLIC BLOOD PRESSURE: 61 MMHG | RESPIRATION RATE: 18 BRPM | OXYGEN SATURATION: 98 % | SYSTOLIC BLOOD PRESSURE: 116 MMHG | TEMPERATURE: 98.3 F | WEIGHT: 143.9 LBS

## 2024-12-26 PROBLEM — Z34.90 CURRENTLY PREGNANT: Status: RESOLVED | Noted: 2024-12-25 | Resolved: 2024-12-26

## 2024-12-26 LAB — T PALLIDUM AB SER QL: NONREACTIVE

## 2024-12-26 PROCEDURE — 120N000001 HC R&B MED SURG/OB

## 2024-12-26 PROCEDURE — T1013 SIGN LANG/ORAL INTERPRETER: HCPCS | Mod: U4,TEL,95

## 2024-12-26 PROCEDURE — 99207 PR SUBSEQUENT HOSPITAL CARE FOR MOTHER, 15 MINUTES: CPT | Performed by: FAMILY MEDICINE

## 2024-12-26 PROCEDURE — 250N000013 HC RX MED GY IP 250 OP 250 PS 637

## 2024-12-26 RX ORDER — DOCUSATE SODIUM 100 MG/1
100 CAPSULE, LIQUID FILLED ORAL 2 TIMES DAILY PRN
Qty: 28 CAPSULE | Refills: 0 | Status: SHIPPED | OUTPATIENT
Start: 2024-12-26 | End: 2025-01-25

## 2024-12-26 RX ORDER — IBUPROFEN 600 MG/1
600 TABLET, FILM COATED ORAL EVERY 6 HOURS PRN
Qty: 30 TABLET | Refills: 0 | Status: SHIPPED | OUTPATIENT
Start: 2024-12-26 | End: 2025-01-09

## 2024-12-26 RX ADMIN — DOCUSATE SODIUM 100 MG: 100 CAPSULE, LIQUID FILLED ORAL at 09:01

## 2024-12-26 RX ADMIN — IBUPROFEN 800 MG: 800 TABLET ORAL at 11:51

## 2024-12-26 RX ADMIN — ACETAMINOPHEN 650 MG: 325 TABLET ORAL at 18:19

## 2024-12-26 RX ADMIN — IBUPROFEN 800 MG: 800 TABLET ORAL at 18:19

## 2024-12-26 RX ADMIN — ACETAMINOPHEN 650 MG: 325 TABLET ORAL at 11:51

## 2024-12-26 ASSESSMENT — ACTIVITIES OF DAILY LIVING (ADL)
ADLS_ACUITY_SCORE: 25
ADLS_ACUITY_SCORE: 24
ADLS_ACUITY_SCORE: 25
ADLS_ACUITY_SCORE: 24
ADLS_ACUITY_SCORE: 24
ADLS_ACUITY_SCORE: 25
ADLS_ACUITY_SCORE: 24
ADLS_ACUITY_SCORE: 25
ADLS_ACUITY_SCORE: 24
ADLS_ACUITY_SCORE: 25
ADLS_ACUITY_SCORE: 24
ADLS_ACUITY_SCORE: 24
ADLS_ACUITY_SCORE: 25
ADLS_ACUITY_SCORE: 24

## 2024-12-26 NOTE — DISCHARGE SUMMARY
Maternal Discharge Summary  [facility]  Date of Service: 2024    Name      Sinan Dubon         1995  MRN       [id number]  PCP        Qiana Brambila at [facility], [phone].    ________________________________________________________________________    Assessment:  Sinan Dubon is a 29 year old now  s/p vaginal, spontaneous at 40w1d on 24.      Discharge Plan:   Discharge to Home. Condition at Discharge:  stable  Labial swelling:  continue ice, start ibuprofen  Physical activity: Regular.  Diet:  Regular.  Home care nurse:  in 2-3 days .  Lactation clinic appointment:  prn .  Contraception plan:  declines currently .  Follow up with Dr. Salmeron  in 6 weeks.       Review of your medicines        START taking        Dose / Directions   docusate sodium 100 MG capsule  Commonly known as: COLACE      Dose: 100 mg  Take 1 capsule (100 mg) by mouth 2 times daily as needed for constipation.  Quantity: 28 capsule  Refills: 0     ibuprofen 600 MG tablet  Commonly known as: ADVIL/MOTRIN      Dose: 600 mg  Take 1 tablet (600 mg) by mouth every 6 hours as needed for moderate pain.  Quantity: 30 tablet  Refills: 0            CONTINUE these medicines which have NOT CHANGED        Dose / Directions   ferrous sulfate 325 (65 Fe) MG tablet  Commonly known as: FEROSUL  Used for: Anemia, unspecified type      Dose: 325 mg  Take 1 tablet (325 mg) by mouth daily (with breakfast).  Quantity: 90 tablet  Refills: 2     Prenatal Vitamin 27-0.8 MG Tabs  Used for: Encounter for supervision of other normal pregnancy in third trimester      Dose: 1 tablet  Take 1 tablet by mouth daily.  Quantity: 90 tablet  Refills: 3               Where to get your medicines        These medications were sent to [facility] [address] [address]      Phone: [phone]    docusate sodium 100 MG capsule  ibuprofen 600 MG tablet        ________________________________________________________________________    Admission Date:  2024  Discharge Date:  2024  Delivery Date:  24  Gestational Age at Delivery: 40w1d    Principal Diagnosis: Labor and delivery  Delivery type: Vaginal, Spontaneous    Active Problems:    Chronic Hepatitis, B Virus    Currently pregnant     (normal spontaneous vaginal delivery)      Subjective:  Patient denies headache, dizziness, dyspnea, and leg pain. Ambulating and eating.    Nursing notes sig perineal swelling.  Pt using ice but not taking any ibuprofen    Discharge Exam:  Patient Vitals for the past 24 hrs:   BP Temp Temp src Pulse Resp SpO2 Height Weight   24 1144 100/57 98  F (36.7  C) Oral 77 18 97 % -- --   24 0749 99/54 97.9  F (36.6  C) -- 80 20 97 % -- --   24 0430 102/59 98.2  F (36.8  C) Oral 93 16 97 % -- --   24 0047 99/57 98.2  F (36.8  C) Oral 75 16 98 % -- --   24 103/57 98.5  F (36.9  C) Oral 83 16 96 % -- --   24 108/65 -- -- -- 18 -- -- --   24 114/70 -- -- -- 18 98 % -- --   24 119/70 -- -- -- 18 -- -- --   24 124/80 98.2  F (36.8  C) -- -- 18 -- -- --   24 125/74 -- -- -- -- -- -- --   24 124/71 -- -- -- -- -- -- --   12/25/24 1840 123/79 -- -- -- -- -- -- --   24 1825 116/68 -- -- -- -- -- -- --   24 1810 113/70 -- -- -- 16 99 % -- --   24 1755 104/63 -- -- -- -- -- -- --   24 1740 106/66 -- -- -- -- -- -- --   24 1600 -- 98.1  F (36.7  C) Oral -- 16 -- -- --   24 1455 112/70 98.2  F (36.8  C) Oral -- 16 98 % -- --   24 1301 -- -- -- -- -- -- 1.524 m (5') 72.6 kg (160 lb 1.6 oz)   24 1250 114/76 98.1  F (36.7  C) Oral -- 16 98 % -- --     General - alert, comfortable  Heart - RRR, no murmurs  Lungs - CTA bilaterally  Abdomen - fundus firm, nontender, below  umbilicus  Extremities - trace edema  :  Moderate labial swelling right > left  Admission on 12/25/2024   Component Date Value Ref Range Status    Hemoglobin 12/25/2024 11.7  11.7 - 15.7 g/dL Final    Treponema Antibody Total 12/25/2024 Nonreactive  Nonreactive Final    ABO/RH(D) 12/25/2024 O POS   Final    Antibody Screen 12/25/2024 Negative  Negative Final    SPECIMEN EXPIRATION DATE 12/25/2024 [id number]   Final      Discharge Medications:   See medication reconciliation.     Completed by:   Adeola Joseph MD  [facility]  12/26/2024 12:01 PM   used: Arti

## 2024-12-26 NOTE — DISCHARGE INSTRUCTIONS
*You have a Home Care nurse visit planned for Saturday, 12/28/24. The nurse will contact you after discharge to confirm the appointment time. If you do not hear from the nurse by Saturday morning, please call [phone].   (Please do not schedule a clinic appointment on the same day as home nurse visit.)      Warning Signs after Having a Baby    Keep this paper on your fridge or somewhere else where you can see it.    Call your provider if you have any of these symptoms up to 12 weeks after having your baby.    Thoughts of hurting yourself or your baby  Pain in your chest or trouble breathing  Severe headache not helped by pain medicine  Eyesight concerns (blurry vision, seeing spots or flashes of light, other changes to eyesight)  Fainting, shaking or other signs of a seizure    Call 9-1-1 if you feel that it is an emergency.     The symptoms below can happen to anyone after giving birth. They can be very serious. Call your provider if you have any of these warning signs.    My provider s phone number: _______________________    Losing too much blood (hemorrhage)    Call your provider if you soak through a pad in less than an hour or pass blood clots bigger than a golf ball. These may be signs that you are bleeding too much.    Blood clots in the legs or lungs    After you give birth, your body naturally clots its blood to help prevent blood loss. Sometimes this increased clotting can happen in other areas of the body, like the legs or lungs. This can block your blood flow and be very dangerous.     Call your provider if you:  Have a red, swollen spot on the back of your leg that is warm or painful when you touch it.   Are coughing up blood.     Infection    Call your provider if you have any of these symptoms:  Fever of 100.4 F (38 C) or higher.  Pain or redness around your stitches if you had an incision.   Any yellow, white, or green fluid coming from places where you had stitches or surgery.    Mood Problems  "(postpartum depression)    Many people feel sad or have mood changes after having a baby. But for some people, these mood swings are worse.     Call your provider right away if you feel so anxious or nervous that you can't care for yourself or your baby.    Preeclampsia (high blood pressure)    Even if you didn't have high blood pressure when you were pregnant, you are at risk for the high blood pressure disease called preeclampsia. This risk can last up to 12 weeks after giving birth.     Call your provider if you have:   Pain on your right side under your rib cage  Sudden swelling in the hands and face    Remember: You know your body. If something doesn't feel right, get medical help.     For informational purposes only. Not to replace the advice of your health care provider. Copyright 2020 [employer]. All rights reserved. Clinically reviewed by Rachna Boston, RNC-OB, MSN. NGRAIN [id number] - Rev .    Postpartum Care at Home With Your Baby: Care Instructions  Overview     After childbirth (postpartum period), your body goes through many changes as you recover. In these weeks after delivery, try to take good care of yourself. Get rest whenever you can and accept help from others.  It may take 4 to 6 weeks to feel like yourself again, and possibly longer if you had a  birth. You may feel sore or very tired as you recover. After delivery, you may continue to have contractions as the uterus returns to the size it was before your pregnancy. You will also have some vaginal bleeding. And you may have pain around the vagina as you heal. Several days after delivery you may also have pain and swelling in your breasts as they fill with milk. There are things you can do at home to help ease these discomforts.  After childbirth, it's common to feel emotional. You may feel irritable, cry easily, and feel happy one minute and sad the next. This is called the \"baby blues.\" Hormone changes are one " cause of these emotional changes. These feelings usually get better within a couple of weeks. If they don't, talk to your doctor or midwife.  In the first couple of weeks after you give birth, your doctor or midwife may want to check in with you and make a plan for follow-up care. You will likely have a complete postpartum visit in the first 3 months after delivery. At that time, your doctor or midwife will check on your recovery and see how you're doing. But if you have questions or concerns before then, you can always call your doctor or midwife.  Follow-up care is a key part of your treatment and safety. Be sure to make and go to all appointments, and call your doctor if you are having problems. It's also a good idea to know your test results and keep a list of the medicines you take.  How can you care for yourself at home?  Taking care of your body  Use pads instead of tampons for bleeding. After birth, you will have bloody vaginal discharge. You may also pass some blood clots that shouldn't be bigger than an egg. Over the next 6 weeks or so, your bleeding should decrease a little every day and slowly change to a pinkish and then whitish discharge.  For cramps or mild pain, try an over-the-counter pain medicine, such as acetaminophen (Tylenol) or ibuprofen (Advil, Motrin). Read and follow all instructions on the label.  To ease pain around the vagina or from hemorrhoids:  Put ice or a cold pack on the area for 10 to 20 minutes at a time. Put a thin cloth between the ice and your skin.  Try sitting in a few inches of warm water (sitz bath) when you can or after bowel movements.  Clean yourself with a gentle squeeze of warm water from a bottle instead of wiping with toilet paper.  Use witch hazel or hemorrhoid pads (such as Tucks).  Try using a cold compress for sore and swollen breasts. And wear a supportive bra that fits.  Ease constipation by drinking plenty of fluids and eating high-fiber foods. Ask your  doctor or midwife about over-the-counter stool softeners.  Activity  Rest when you can.  Ask for help from family or friends when you need it.  If you can, have another adult in your home for at least 2 or 3 days after birth.  When you feel ready, try to get some exercise every day. For many people, walking is a good choice. Don't do any heavy exercise until your doctor or midwife says it's okay.  Ask your doctor or midwife when it is okay to have vaginal sex.  If you don't want to get pregnant, talk to your doctor or midwife about birth control options. You can get pregnant even before your period returns. Also, you can get pregnant while you are breastfeeding.  Talk to your doctor or midwife if you want to get pregnant again. They can talk to you about when it is safe.  Emotional health  It's normal to have some sadness, anxiety, and mood swings after delivery. It may help to talk with a trusted friend or family member. You can also call the Maternal Mental Health Hotline at 5-457-[facility] (1-838.431.3254) for support. If these mood changes last more than a couple of weeks, talk to your doctor or midwife.  When should you call for help?  Share this information with your partner, family, or a friend. They can help you watch for warning signs.  Call 740  anytime you think you may need emergency care. For example, call if:    You feel you cannot stop from hurting yourself, your baby, or someone else.     You passed out (lost consciousness).     You have chest pain, are short of breath, or cough up blood.     You have a seizure.   Where to get help 24 hours a day, 7 days a week   If you or someone you know talks about suicide, self-harm, a mental health crisis, a substance use crisis, or any other kind of emotional distress, get help right away. You can:    Call the Suicide and Crisis Lifeline at 945.     Call 0-541-371-TALK (1-332.913.9635).     Text HOME to 153200 to access the Crisis Text Line.   Consider saving  these numbers in your phone.  Go to [employer] for more information or to chat online.  Call your doctor or midwife now or seek immediate medical care if:    You have signs of hemorrhage (too much bleeding), such as:  Heavy vaginal bleeding. This means that you are soaking through one or more pads in an hour. Or you pass blood clots bigger than an egg.  Feeling dizzy or lightheaded, or you feel like you may faint.  Feeling so tired or weak that you cannot do your usual activities.  A fast or irregular heartbeat.  New or worse belly pain.     You have signs of infection, such as:  A fever.  Increased pain, swelling, warmth, or redness from an incision or wound.  Frequent or painful urination or blood in your urine.  Vaginal discharge that smells bad.  New or worse belly pain.     You have symptoms of a blood clot in your leg (called a deep vein thrombosis), such as:  Pain in the calf, back of the knee, thigh, or groin.  Swelling in the leg or groin.  A color change on the leg or groin. The skin may be reddish or purplish, depending on your usual skin color.     You have signs of preeclampsia, such as:  Sudden swelling of your face, hands, or feet.  New vision problems (such as dimness, blurring, or seeing spots).  A severe headache.     You have signs of heart failure, such as:  New or increased shortness of breath.  New or worse swelling in your legs, ankles, or feet.  Sudden weight gain, such as more than 2 to 3 pounds in a day or 5 pounds in a week.  Feeling so tired or weak that you cannot do your usual activities.     You had spinal or epidural pain relief and have:  New or worse back pain.  Increased pain, swelling, warmth, or redness at the injection site.  Tingling, weakness, or numbness in your legs or groin.   Watch closely for changes in your health, and be sure to contact your doctor or midwife if:    Your vaginal bleeding isn't decreasing.     You feel sad, anxious, or hopeless for more than a few  "days.     You are having problems with your breasts or breastfeeding.   Where can you learn more?  Go to https://www.[employer].net/patiented  Enter Z768 in the search box to learn more about \"Postpartum Care at Home With Your Baby: Care Instructions.\"  Current as of: April 30, 2024  Content Version: 14.3    2024 [employer].   Care instructions adapted under license by your healthcare professional. If you have questions about a medical condition or this instruction, always ask your healthcare professional. [employer] disclaims any warranty or liability for your use of this information.        "

## 2024-12-26 NOTE — PLAN OF CARE
Goal Outcome Evaluation:  Patient's VSS. Fundus is firm at umbilicus with light/scant lochia. Patient is up ad kobi and performing self-cares independently. Reporting cramping pain being treated with heat packs, medication offered, but declined at this time. Patient's spouse, Yemi, is in the room with her and infant, attentive/supportive and participating in cares.    Patient aware to fill out birth certificate and PPMA; has not yet completed them.    Interpreters with the following IDs were used with cares for patient and infant: [id number] and [id number].     /59 (BP Location: Right arm, Patient Position: Semi-Carrasco's, Cuff Size: Adult Regular)   Pulse 93   Temp 98.2  F (36.8  C) (Oral)   Resp 16   Ht 1.524 m (5')   Wt 72.6 kg (160 lb 1.6 oz)   LMP 02/28/2024 (Exact Date)   SpO2 97%   Breastfeeding Unknown   BMI 31.27 kg/m            Problem: Adult Inpatient Plan of Care  Goal: Optimal Comfort and Wellbeing  Outcome: Progressing     Problem: Postpartum (Vaginal Delivery)  Goal: Successful Parent Role Transition  Outcome: Progressing     Problem: Postpartum (Vaginal Delivery)  Goal: Hemostasis  Outcome: Progressing     Problem: Postpartum (Vaginal Delivery)  Goal: Optimal Pain Control and Function  Outcome: Progressing       Plan of Care Reviewed With: patient    Overall Patient Progress: improvingOverall Patient Progress: improving

## 2024-12-26 NOTE — PROGRESS NOTES
Data: Sinan Dubon transferred to [facility]/QKPJ99-1 via wheelchair. Patient transferred to Postpartum with .    Action: Receiving unit notified of transfer. Patient and support person notified of room change. Patient and belongings accompanied by Registered Nurse. Bedside report given to Shalom rojas RN. Fundal check performed with receiving RN. Oriented patient to surroundings. Call light within reach.    Response: Patient tolerated transfer.    ANNEL LABOY RN  2024 9:11 PM

## 2024-12-26 NOTE — PLAN OF CARE
Goal Outcome Evaluation:             Successful , now stable postpartum. Denies pain. Declines to void at thsi time, agrees to assistance with this within the hour

## 2024-12-26 NOTE — L&D DELIVERY NOTE
Delivery Summary    29 year old  at 40w1d presented with painful contractions, no leakage of fluid. SVE on arrival 5 cm. She requested AROM - performed for clear fluid. She progressed quickly over the next few hours and started feeling the urge to push. She pushed in several different positions, suspect baby was OP as she had significant back labor and I could visualize baby rotating throughout the second stage. Baby delivered in JORDEN position with tight nuchal cord that was delivered through. Skin-to-skin was initiated. 3 vessel cord was cut and clamped after 1 minute 30 seconds. Spontaneous intact placenta was delivered over intact perineum. Fundus was noted to be firm with approximately 250 ml blood loss. Methergine was given due to history of postpartum hemorrhage and continued trickling - bleeding stopped shortly after dose was given. Pitocin was also initiated after delivery. Routine post partum cares.      Stephany Rinaldi MD  [facility]      Sinan B[facility]TISTE [facility] MRN# [id number]   Age: 29 year old YOB: 1995     Labor Event Times:    Labor Onset Date 2024      Labor Onset Time 4:15 PM   Dilation Complete Date 2024   Dilation Complete Time 5:12 PM      Start Pushing Date        Start Pushing Time            Labor Length:    1st Stage (hrs/min) No data found No data found   2nd Stage (hrs/min) No data found No data found   3rd Stage (hrs/min) No data found No data found       Labor Events:     Labor No   Rupture Date 2024    Rupture Time 2:20 PM    Rupture Type Artificial Rupture of Membranes   Fluid Color Clear    Labor Type     Induction No data found   Induction Indication         Augmentation No data found   Labor Complications     Additional Complications     Management of Labor        Antibiotics     IV Antibiotic Given     Additional Management     Fetal Status Prior to  Delivery     Fetal Status Comments         Cervical Ripening:    Date No  "data found    Time No data found    Type No data found        Delivery:    Episiotomy None   Local Anesthetic        Lacerations None   Sponge Count Correct       Needle Count Correct     Final Count by: MALIA BERNARD MOLLY K   Sutures     Blood loss (ml) No data found   Packing Intentionally Left In     Number     Comments           Information for the patient's :  Lucy Dubon-Sinan [[id number]]     Delivery  2024 5:32 PM by  Vaginal, Spontaneous  Sex:  male Gestational Age: 40w1d  Delivery Clinician:     Living?:            APGARS  One minute Five minutes Ten minutes   Skin color:            Heart rate:            Grimace:            Muscle tone:            Breathing:            Totals: 8  9         Presentation/position:           Resuscitation and Interventions: Method:  None  Oxygen Type:     Intubation Time:   # of Attempts:     ETT Size:        Tracheal Suction:     Tracheal returns:       Care at Delivery:           Cord information:     Disposition of cord blood:      Blood gases sent?    Complications:     Placenta: Delivered:           appearance.  Comments:  .  Disposition: Hospital disposal   Measurements:  Weight: 8 lb 13.5 oz (4010 g)  Height: 21\"  Head circumference: 35.6 cm  Chest circumference:     Temperature:     Other providers:       Additional  information:  Forceps:    Verbal Informed Consent Obtained:       Alternative Labor Strategies Discussed:     Emergency Resources Available:       Type:       Accrued Pulling Time:       # of Pulls:      Position:     Fetal Station:       Indications:      Other Indications:     Operative Vaginal Delivery Brief Note Forceps:        Vacuum:    Verbal Informed Consent Obtained:     Alternative Labor Strategies Discussed:     Emergency Resources Available:     Type:      Accrued Pulling Time:       # of Pop-Offs:       # of Pulls:       Position:     Fetal Station:      Indications for Vacuum:       Other Indications:  "   Operative Vaginal Delivery Brief Note Vacuum:        Shoulder Dystocia Shoulder Dystocia    Fetal Tracing Prior to Delivery: Category 1  Shoulder dystocia present?: No          Breech:       : Type:     Indications for Primary:     Indications for Secondary:     Other Indications:        Observed anomalies     Output in Delivery Room: Stool

## 2024-12-26 NOTE — PROGRESS NOTES
[facility]irth[facility] RN Care Coordinator Note    Sinan K [facility]  [id number]  1995    Chart reviewed, discharge plan discussed with bedside RN and attending MD, needs assessed. If stable, discharge planned this evening after  testing. Patient requests home care visit, nurse visit planned Saturday, 24; Augustus  needed. [facility] Intake contacted, updated by this writer; patient added to [facility] schedule. Follow-up post-delivery appointment with Dr. Salmeron planned 6 weeks at [facility].    RN Care Coordinator will continue to follow and assist as needed with discharge plan.

## 2024-12-26 NOTE — PLAN OF CARE
Problem: Adult Inpatient Plan of Care  Goal: Optimal Comfort and Wellbeing  Outcome: Progressing  Intervention: Monitor Pain and Promote Comfort  Recent Flowsheet Documentation  Taken 12/26/2024 1238 by Mar Morataya RN  Pain Management Interventions: medication offered but refused  Taken 12/26/2024 1151 by Mar Morataya RN  Pain Management Interventions:   quiet environment facilitated   rest   pain management plan reviewed with patient/caregiver   medication (see MAR)  Intervention: Provide Person-Centered Care  Recent Flowsheet Documentation  Taken 12/26/2024 1527 by Mar Morataya RN  Trust Relationship/Rapport:   care explained   choices provided   emotional support provided   empathic listening provided   questions answered   questions encouraged   reassurance provided  Taken 12/26/2024 0749 by Mar Morataya RN  Trust Relationship/Rapport:   care explained   choices provided   emotional support provided   empathic listening provided   questions answered   questions encouraged   reassurance provided   Goal Outcome Evaluation:      Plan of Care Reviewed With: patient, spouse    Overall Patient Progress: improvingOverall Patient Progress: improving    Vibha  used for all cares. VSS, uterus firm, bleeding scant. Perineum swollen, especially on left side, becoming tender. Discussed using ice packs, tucks, sitz bath, and Ibuprofen for comfort. Patient using Ibuprofen and Tylenol with relief from cramping pain.

## 2024-12-26 NOTE — PROGRESS NOTES
D:  Patient admitted to [facility]/[id number] via wheelchair with  and support person Yemi.  A:  Bedside report received from Carla ALMANZAR RN. Oriented patient and family to surroundings; call light within reach. 4 Part ID bands and fundal assessment double checked with reporting RN.  R:  Patient and  tolerated transfer. Care assumed at  this evening.

## 2024-12-27 ASSESSMENT — ACTIVITIES OF DAILY LIVING (ADL): ADLS_ACUITY_SCORE: 24

## 2024-12-27 NOTE — PLAN OF CARE
Goal Outcome Evaluation:  Patient's VSS. Fundus is firm 1 below umbilicus with scant lochia. Patient is up ad kobi and performing self-cares independently. Reporting no pain. Patient's spouse is in the room with her and infant, attentive/supportive and participating in cares.      /61 (BP Location: Left arm)   Pulse 72   Temp 98.3  F (36.8  C) (Oral)   Resp 18   Ht 1.524 m (5')   Wt 65.3 kg (143 lb 14.4 oz)   LMP 02/28/2024 (Exact Date)   SpO2 98%   Breastfeeding Unknown   BMI 28.10 kg/m            Problem: Postpartum (Vaginal Delivery)  Goal: Successful Parent Role Transition  Outcome: Adequate for Care Transition     Problem: Postpartum (Vaginal Delivery)  Goal: Optimal Pain Control and Function  Outcome: Adequate for Care Transition     Problem: Breastfeeding  Goal: Effective Breastfeeding  Outcome: Adequate for Care Transition       Plan of Care Reviewed With: patient, spouse    Overall Patient Progress: improvingOverall Patient Progress: improving

## 2024-12-27 NOTE — PROGRESS NOTES
D:  Patient desires discharge home.  Discharge orders received and entered by provider.  A:  Discharge instructions reviewed with the patient.  All questions and concerns addressed with  [id number].  R:  Discharge criteria met.  4 Part ID bands double checked.  [place] discharged in car seat with parents.  The nursing assistant escorted patient to hospital lobby at 2310 this evening, 2024 .

## 2024-12-30 ENCOUNTER — MEDICAL CORRESPONDENCE (OUTPATIENT)
Dept: HEALTH INFORMATION MANAGEMENT | Facility: CLINIC | Age: 29
End: 2024-12-30

## 2024-12-31 ENCOUNTER — HOSPITAL ENCOUNTER (EMERGENCY)
Facility: HOSPITAL | Age: 29
Discharge: HOME | End: 2024-12-31
Attending: EMERGENCY MEDICINE
Payer: COMMERCIAL

## 2024-12-31 ENCOUNTER — APPOINTMENT (OUTPATIENT)
Dept: ULTRASOUND IMAGING | Facility: HOSPITAL | Age: 29
End: 2024-12-31
Attending: EMERGENCY MEDICINE
Payer: COMMERCIAL

## 2024-12-31 VITALS
RESPIRATION RATE: 16 BRPM | BODY MASS INDEX: 27.93 KG/M2 | DIASTOLIC BLOOD PRESSURE: 80 MMHG | WEIGHT: 143 LBS | HEART RATE: 93 BPM | TEMPERATURE: 97.3 F | OXYGEN SATURATION: 97 % | SYSTOLIC BLOOD PRESSURE: 126 MMHG

## 2024-12-31 LAB
ANION GAP SERPL CALCULATED.3IONS-SCNC: 13 MMOL/L (ref 7–15)
BUN SERPL-MCNC: 9 MG/DL (ref 6–20)
CALCIUM SERPL-MCNC: 8.9 MG/DL (ref 8.8–10.4)
CHLORIDE SERPL-SCNC: 104 MMOL/L (ref 98–107)
CREAT SERPL-MCNC: 0.54 MG/DL (ref 0.51–0.95)
EGFRCR SERPLBLD CKD-EPI 2021: >90 ML/MIN/1.73M2
ERYTHROCYTE [DISTWIDTH] IN BLOOD BY AUTOMATED COUNT: 13.9 % (ref 10–15)
GLUCOSE SERPL-MCNC: 106 MG/DL (ref 70–99)
HCO3 SERPL-SCNC: 22 MMOL/L (ref 22–29)
HCT VFR BLD AUTO: 31.3 % (ref 35–47)
HGB BLD-MCNC: 10.7 G/DL (ref 11.7–15.7)
INR PPP: 0.98 (ref 0.85–1.15)
MCH RBC QN AUTO: 29.8 PG (ref 26.5–33)
MCHC RBC AUTO-ENTMCNC: 34.2 G/DL (ref 31.5–36.5)
MCV RBC AUTO: 87 FL (ref 78–100)
PLATELET # BLD AUTO: 214 10E3/UL (ref 150–450)
POTASSIUM SERPL-SCNC: 3.7 MMOL/L (ref 3.4–5.3)
RBC # BLD AUTO: 3.59 10E6/UL (ref 3.8–5.2)
SODIUM SERPL-SCNC: 139 MMOL/L (ref 135–145)
WBC # BLD AUTO: 11.1 10E3/UL (ref 4–11)

## 2024-12-31 PROCEDURE — 36415 COLL VENOUS BLD VENIPUNCTURE: CPT | Performed by: EMERGENCY MEDICINE

## 2024-12-31 PROCEDURE — 82310 ASSAY OF CALCIUM: CPT | Performed by: EMERGENCY MEDICINE

## 2024-12-31 PROCEDURE — 80048 BASIC METABOLIC PNL TOTAL CA: CPT | Performed by: EMERGENCY MEDICINE

## 2024-12-31 PROCEDURE — 85027 COMPLETE CBC AUTOMATED: CPT | Performed by: EMERGENCY MEDICINE

## 2024-12-31 PROCEDURE — 76856 US EXAM PELVIC COMPLETE: CPT

## 2024-12-31 PROCEDURE — 85610 PROTHROMBIN TIME: CPT | Performed by: EMERGENCY MEDICINE

## 2024-12-31 PROCEDURE — 99284 EMERGENCY DEPT VISIT MOD MDM: CPT | Mod: 25

## 2024-12-31 ASSESSMENT — COLUMBIA-SUICIDE SEVERITY RATING SCALE - C-SSRS
1. IN THE PAST MONTH, HAVE YOU WISHED YOU WERE DEAD OR WISHED YOU COULD GO TO SLEEP AND NOT WAKE UP?: NO
2. HAVE YOU ACTUALLY HAD ANY THOUGHTS OF KILLING YOURSELF IN THE PAST MONTH?: NO
6. HAVE YOU EVER DONE ANYTHING, STARTED TO DO ANYTHING, OR PREPARED TO DO ANYTHING TO END YOUR LIFE?: NO

## 2024-12-31 ASSESSMENT — ACTIVITIES OF DAILY LIVING (ADL)
ADLS_ACUITY_SCORE: 50
ADLS_ACUITY_SCORE: 50

## 2024-12-31 NOTE — ED PROVIDER NOTES
EMERGENCY DEPARTMENT ENCOUNTER      NAME: Sinan Dubon  AGE: 29 year old female  YOB: 1995  MRN: [id number]  EVALUATION DATE & TIME: No admission date for patient encounter.    PCP: Qiana Salmeron    ED PROVIDER: Cornell Mojica M.D.      Chief Complaint   Patient presents with    Pelvic Pain           Vaginal Bleeding         FINAL IMPRESSION:  Postpartum vaginal bleeding  Passage of retained product    ED COURSE & MEDICAL DECISION MAKING:    Pertinent Labs & Imaging studies reviewed. (See chart for details)  29 year old female presents to the Emergency Department for evaluation of vaginal bleeding after recent delivery.  Patient had uncomplicated normal spontaneous vaginal delivery on 1225.  States she has had continued bleeding since then.  Now feels like she has a clot stuck within the vagina.  Reports intermittent discomfort but none presently.  Patient seen in triage area due to ED overcrowding.  Exam limited.  However patient without suprapubic tenderness to suggest endometritis.  Will obtain baseline blood work and ultrasound.  Patient will eventually require a pelvic exam.. Patient appears non toxic with stable vitals signs.     3:18 PM I met with the patient for the initial interview and physical examination. Discussed plan for treatment and workup in the ED.    4:53 PM.  Ultrasound with thickened endometrium but no evidence of retained products.  5:13 PM.  Hemoglobin slightly reduced at 10.7 this is down 1 g compared to hemoglobin 6 days ago.  Will proceed with pelvic exam to assess degree of bleeding  5:38 PM.  Pelvic exam performed with nurse.  Patient had just returned from the bathroom.  Reports passing a large clot in the bathroom.  Exam revealed minimal residual bleeding but a small amount of tissue hanging from the os.  This was easily removed.  Patient likely with passage of retained products now with minimal postpartum bleeding.  Will discuss findings with OB.  6:22 PM I spoke on the  phone with Dr. Daniels with OBGYN about the patient.  Plan for continued outpatient management.  Routine return precautions given.    At the conclusion of the encounter I discussed the results of all of the tests and the disposition. The questions were answered and return precautions provided. The patient or family acknowledged understanding and was agreeable with the care plan.       PPE: Provider wore N95 mask    MEDICATIONS GIVEN IN THE EMERGENCY:  Medications - No data to display    NEW PRESCRIPTIONS STARTED AT TODAY'S ER VISIT  New Prescriptions    No medications on file          =================================================================    HPI    Patient information was obtained from: patient    Use of Intrepreter: Yes (Phone) - Language Vibha Dubon is a 29 year old female with a pertient medical history of recent child birth who presents to the ED for evaluation of pelvic pain and vaginal bleeding.    Per patient via , after her recent child birth her vaginal bleeding has not changed since leaving the hospital. However, she now feels like she has a clot. She had some abdominal discomfort earlier today (12/31/2024) which is no longer present.    She has had no lightheadedness. She did not mention taking anything for her symptoms today. No daily medications were mentioned.    Chart review:  Per Chart Review, the patient was seen from 12/25/2024-12/26/2024 at [facility] for child birth. After delivery methergine was given due to her history of postpartum hemorrhage and continued trickling, but the bleeding stopped shortly after the dose was given.    REVIEW OF SYSTEMS   All systems reviewed are negative unless noted positive in the HPI. Pertinent negatives are also noted in the HPI.     PAST MEDICAL HISTORY:  Past Medical History:   Diagnosis Date    Adjustment disorder with depressed mood     In remission. Denies concerns during pregnancy. Continue to monitor.          Anemia 03/09/2015    Viral hepatitis B chronic (H)        PAST SURGICAL HISTORY:  No past surgical history on file.      CURRENT MEDICATIONS:    No current facility-administered medications for this encounter.    Current Outpatient Medications:     docusate sodium (COLACE) 100 MG capsule, Take 1 capsule (100 mg) by mouth 2 times daily as needed for constipation., Disp: 28 capsule, Rfl: 0    ferrous sulfate (FEROSUL) 325 (65 Fe) MG tablet, Take 1 tablet (325 mg) by mouth daily (with breakfast)., Disp: 90 tablet, Rfl: 2    ibuprofen (ADVIL/MOTRIN) 600 MG tablet, Take 1 tablet (600 mg) by mouth every 6 hours as needed for moderate pain., Disp: 30 tablet, Rfl: 0    Prenatal Vit-Fe Fumarate-FA (PRENATAL VITAMIN) 27-0.8 MG TABS, Take 1 tablet by mouth daily., Disp: 90 tablet, Rfl: 3    ALLERGIES:  No Known Allergies    FAMILY HISTORY:  Family History   Problem Relation Age of Onset    No Known Problems Mother     No Known Problems Father        SOCIAL HISTORY:   Social History     Socioeconomic History    Marital status: Single   Tobacco Use    Smoking status: Never     Passive exposure: Never    Smokeless tobacco: Never   Vaping Use    Vaping status: Never Used   Substance and Sexual Activity    Alcohol use: No    Drug use: No    Sexual activity: Yes     Partners: Male     Birth control/protection: None   Social History Narrative    FOB [place], cultrually . Lives with FOB family.  Feels safe and supported. Receives therapy from [facility]     Social Drivers of Health     Financial Resource Strain: Low Risk  (12/25/2024)    Financial Resource Strain     Within the past 12 months, have you or your family members you live with been unable to get utilities (heat, electricity) when it was really needed?: No   Food Insecurity: Low Risk  (12/25/2024)    Food Insecurity     Within the past 12 months, did you worry that your food would run out before you got money to buy more?: No     Within the past 12  months, did the food you bought just not last and you didn t have money to get more?: No   Transportation Needs: Low Risk  (12/25/2024)    Transportation Needs     Within the past 12 months, has lack of transportation kept you from medical appointments, getting your medicines, non-medical meetings or appointments, work, or from getting things that you need?: No   Interpersonal Safety: Unknown (12/25/2024)    Interpersonal Safety     Do you feel physically and emotionally safe where you currently live?: Patient unable to answer     Within the past 12 months, have you been hit, slapped, kicked or otherwise physically hurt by someone?: Patient unable to answer     Within the past 12 months, have you been humiliated or emotionally abused in other ways by your partner or ex-partner?: Patient unable to answer   Housing Stability: Low Risk  (12/25/2024)    Housing Stability     Do you have housing? : Yes     Are you worried about losing your housing?: No       VITALS:  Patient Vitals for the past 24 hrs:   BP Temp Pulse Resp SpO2 Weight   12/31/24 1416 126/80 97.3  F (36.3  C) 93 16 97 % 64.9 kg (143 lb)        PHYSICAL EXAM    Constitutional:  Awake, alert, in no apparent distress  HENT:  Normocephalic, Atraumatic. Bilateral external ears normal. Oropharynx moist. Nose normal. Neck- Normal range of motion with no guarding, No midline cervical tenderness, Supple, No stridor.   Eyes:  PERRL, EOMI with no signs of entrapment, Conjunctiva normal, No discharge.   Respiratory:  Normal breath sounds, No respiratory distress, No wheezing.    Cardiovascular:  Normal heart rate, Normal rhythm, No appreciable rubs or gallops.   GI:  Soft, No distension, No palpable masses, Mild suprapubic tenderness  : Minimal spontaneous bleeding.  Small amount of tissue hanging from os.  No cervical motion tenderness  Musculoskeletal:  Intact distal pulses, No edema. Good range of motion in all major joints. No tenderness to palpation or major  deformities noted.  Integument:  Warm, Dry, No erythema, No rash.   Neurologic:  Alert & oriented, Normal motor function, Normal sensory function, No focal deficits noted.   Psychiatric:  Affect normal, Judgment normal, Mood normal.     LAB:  All pertinent labs reviewed and interpreted.     Results for orders placed or performed during the hospital encounter of 12/31/24   US Pelvis Complete without Transvaginal     Status: None    Narrative    EXAM: US PELVIC TRANSABDOMINAL  LOCATION: [facility]  DATE: 12/31/2024    INDICATION: Pelvic pain, 6 days postpartum  COMPARISON: None.  TECHNIQUE: Transabdominal scans were performed.    FINDINGS:    UTERUS: Enlarged and heterogeneous uterus, consistent with postpartum state. Endometrium is heterogeneously thickened up to 19 mm, however no color flow to suggest retained products. 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     RIGHT OVARY: 2.8 x 2.3 x 1.5 cm. Normal appearance with arterial and venous spectral Doppler waveforms obtained.     LEFT OVARY: Obscured by bowel gas.     No significant free fluid.      Impression    IMPRESSION:  1.  Expected postpartum appearance of the uterus without findings of retained products or other acute abnormality.  2.  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   INR     Status: Normal   Result Value Ref Range    INR 0.98 0.85 - 1.15   Basic metabolic panel     Status: Abnormal   Result Value Ref Range    Sodium 139 135 - 145 mmol/L    Potassium 3.7 3.4 - 5.3 mmol/L    Chloride 104 98 - 107 mmol/L    Carbon Dioxide (CO2) 22 22 - 29 mmol/L    Anion Gap 13 7 - 15 mmol/L    Urea Nitrogen 9.0 6.0 - 20.0 mg/dL    Creatinine 0.54 0.51 - 0.95 mg/dL    GFR Estimate >90 >60 mL/min/1.73m2    Calcium 8.9 8.8 - 10.4 mg/dL    Glucose 106 (H) 70 - 99 mg/dL   CBC (+ platelets, no diff)     Status: Abnormal   Result Value Ref Range    WBC Count 11.1 (H) 4.0 - 11.0 10e3/uL    RBC Count 3.59 (L) 3.80 - 5.20  10e6/uL    Hemoglobin 10.7 (L) 11.7 - 15.7 g/dL    Hematocrit 31.3 (L) 35.0 - 47.0 %    MCV 87 78 - 100 fL    MCH 29.8 26.5 - 33.0 pg    MCHC 34.2 31.5 - 36.5 g/dL    RDW 13.9 10.0 - 15.0 %    Platelet Count 214 150 - 450 10e3/uL      RADIOLOGY:  Reviewed all pertinent imaging. Please see official radiology report.  US Pelvic Complete w Transvaginal    (Results Pending)              I, Jay Zepeda, am serving as a scribe to document services personally performed by Cornell Mojica MD, based on my observation and the provider's statements to me. I, Cornell Mojica MD attest that Jay Zepeda is acting in a scribe capacity, has observed my performance of the services and has documented them in accordance with my direction.    Cornell Mojica M.D.  Emergency Medicine  [facility] EMERGENCY DEPARTMENT      Cornell Mojica MD  12/31/24 3434

## 2024-12-31 NOTE — ED TRIAGE NOTES
"The pt presents for evaluation of pelvic pain and increased vaginal bleeding. She is 6 days post partum from vaginal birth. She notes an uneventful recovery until today, increased pain and she reports large clots. The pt showed the triage RN a photo on her phone, which appears to be a large clot hanging from her vagina. The pt reports this clot is \"stuck.\"         "

## 2025-02-06 ENCOUNTER — OFFICE VISIT (OUTPATIENT)
Dept: URGENT CARE | Facility: URGENT CARE | Age: 30
End: 2025-02-06
Payer: COMMERCIAL

## 2025-02-06 VITALS
OXYGEN SATURATION: 97 % | WEIGHT: 129 LBS | SYSTOLIC BLOOD PRESSURE: 129 MMHG | BODY MASS INDEX: 25.19 KG/M2 | DIASTOLIC BLOOD PRESSURE: 86 MMHG | HEART RATE: 117 BPM | RESPIRATION RATE: 18 BRPM | TEMPERATURE: 101.1 F

## 2025-02-06 DIAGNOSIS — Z20.818 STREP THROAT EXPOSURE: Primary | ICD-10-CM

## 2025-02-06 DIAGNOSIS — J02.9 SORE THROAT: ICD-10-CM

## 2025-02-06 DIAGNOSIS — J06.9 VIRAL URI: ICD-10-CM

## 2025-02-06 LAB
DEPRECATED S PYO AG THROAT QL EIA: NEGATIVE
FLUAV AG SPEC QL IA: NEGATIVE
FLUBV AG SPEC QL IA: NEGATIVE
S PYO DNA THROAT QL NAA+PROBE: NOT DETECTED

## 2025-02-06 RX ORDER — AMOXICILLIN 500 MG/1
500 CAPSULE ORAL 2 TIMES DAILY
Qty: 20 CAPSULE | Refills: 0 | Status: SHIPPED | OUTPATIENT
Start: 2025-02-06 | End: 2025-02-16

## 2025-02-06 RX ORDER — BENZONATATE 100 MG/1
100 CAPSULE ORAL 3 TIMES DAILY PRN
Qty: 30 CAPSULE | Refills: 0 | Status: SHIPPED | OUTPATIENT
Start: 2025-02-06

## 2025-02-06 NOTE — PROGRESS NOTES
ASSESSMENT & PLAN:   Diagnoses and all orders for this visit:  Strep throat exposure  -     amoxicillin (AMOXIL) 500 MG capsule; Take 1 capsule (500 mg) by mouth 2 times daily for 10 days.  Sore throat  -     Streptococcus A Rapid Screen w/Reflex to PCR  -     Influenza A & B Antigen  -     Group A Streptococcus PCR Throat Swab  Viral URI  -     benzonatate (TESSALON) 100 MG capsule; Take 1 capsule (100 mg) by mouth 3 times daily as needed for cough.    URI symptoms x 5 days. Febrile at 101.1F with no antipyretic today.  Rapid strep test negative, PCR pending. Of note, both children in clinic had positive strep tests today, so will treat empirically with Amoxicillin x 10 days. Influenza test negative. Supportive treatment with rest, fluids, tylenol/ibuprofen, tessalon as needed    At the end of the encounter, I discussed results, diagnosis, medications. Discussed red flags for immediate return to clinic/ER, as well as indications for follow up if no improvement. Patient and/or caregiver understood and agreed to plan. Patient was stable for discharge.    Patient Instructions   We are treating you for strep throat.    You will need to be on antibiotic treatment for 12 hours before returning to school/work.  Change your toothbrush in 3 days to prevent reinfection.  For your sore throat, you may try tylenol/ibuprofen, salt water gargles, throat lozenges, using humidifier, warm beverages.  Make sure to drink plenty of fluids and wash your hands often.  Follow-up with your PCP if you have continued pain in 3-5 days.     No follow-ups on file.    ------------------------------------------------------------------------  SUBJECTIVE  History was obtained from patient.    Patient presents with:  Cough: X 1 week, cough, fever, throat pain, congestion.     HPI  Sinan Dubon is a(n) 29 year old female presenting to urgent care for URI symptoms x 5 days. Reports cough, sore throat, rhinorrhea. Initially had fever, that has resolved.  No shortness of breath, chest pain, nausea, vomiting, diarrhea. Children sick with same symptoms.     Current Outpatient Medications   Medication Sig Dispense Refill    amoxicillin (AMOXIL) 500 MG capsule Take 1 capsule (500 mg) by mouth 2 times daily for 10 days. 20 capsule 0    benzonatate (TESSALON) 100 MG capsule Take 1 capsule (100 mg) by mouth 3 times daily as needed for cough. 30 capsule 0    ferrous sulfate (FEROSUL) 325 (65 Fe) MG tablet Take 1 tablet (325 mg) by mouth daily (with breakfast). 90 tablet 2    Prenatal Vit-Fe Fumarate-FA (PRENATAL VITAMIN) 27-0.8 MG TABS Take 1 tablet by mouth daily. 90 tablet 3     Problem List:  2024:  (normal spontaneous vaginal delivery)  2024: Currently pregnant  2018: Lactating mother  2018: Pregnant  2018: Pregnancy  2015: Routine postpartum follow-up  2015: Pregnant  2015: Active labor  2015:  (normal spontaneous vaginal delivery)  2015: Post-term pregnancy, 40-42 weeks of gestation  2015: Anemia  2015: Supervision of normal first pregnancy  Monocular Esotropia Of The Right Eye  Chronic Hepatitis, B Virus  Adjustment disorder with depressed mood  Strabismus   (normal spontaneous vaginal delivery)    No Known Allergies      OBJECTIVE  Vitals:    25 1516   BP: 129/86   BP Location: Right arm   Patient Position: Sitting   Cuff Size: Adult Regular   Pulse: 117   Resp: 18   Temp: 101.1  F (38.4  C)   TempSrc: Tympanic   SpO2: 97%   Weight: 58.5 kg (129 lb)     Physical Exam   GENERAL: healthy, alert, no acute distress.   PSYCH: mentation appears normal. Normal affect  HEAD: normocephalic, atraumatic.  EYE: PERRL. EOMs intact. No scleral injection bilaterally.   EAR: external ear normal. Bilateral ear canals normal and nonpainful. Bilateral TM intact, pearly, translucent without bulging.  NOSE: external nose atraumatic without lesions.  OROPHARYNX: moist mucous membranes. Posterior oropharynx with mild erythema, no  exudate. Uvula midline. Patent airway.  LUNGS: no increased work of breathing. Clear lung sounds bilaterally. No wheezing, rhonchi, or rales.   CV: regular rate and rhythm. No clicks, murmurs, or rubs.    Results for orders placed or performed in visit on 02/06/25   Streptococcus A Rapid Screen w/Reflex to PCR     Status: Normal    Specimen: Throat; Swab   Result Value Ref Range    Group A Strep antigen Negative Negative   Influenza A & B Antigen     Status: Normal    Specimen: Nose; Swab   Result Value Ref Range    Influenza A antigen Negative Negative    Influenza B antigen Negative Negative    Narrative    Test results must be correlated with clinical data. If necessary, results should be confirmed by a molecular assay or viral culture.

## 2025-02-06 NOTE — PATIENT INSTRUCTIONS
We are treating you for strep throat.    You will need to be on antibiotic treatment for 12 hours before returning to school/work.  Change your toothbrush in 3 days to prevent reinfection.  For your sore throat, you may try tylenol/ibuprofen, salt water gargles, throat lozenges, using humidifier, warm beverages.  Make sure to drink plenty of fluids and wash your hands often.  Follow-up with your PCP if you have continued pain in 3-5 days.

## 2025-03-04 ENCOUNTER — MEDICAL CORRESPONDENCE (OUTPATIENT)
Dept: HEALTH INFORMATION MANAGEMENT | Facility: CLINIC | Age: 30
End: 2025-03-04
Payer: COMMERCIAL

## 2025-05-01 ENCOUNTER — MEDICAL CORRESPONDENCE (OUTPATIENT)
Dept: HEALTH INFORMATION MANAGEMENT | Facility: CLINIC | Age: 30
End: 2025-05-01
Payer: COMMERCIAL

## 2025-06-30 ENCOUNTER — MEDICAL CORRESPONDENCE (OUTPATIENT)
Dept: HEALTH INFORMATION MANAGEMENT | Facility: CLINIC | Age: 30
End: 2025-06-30
Payer: COMMERCIAL